# Patient Record
Sex: FEMALE | Race: WHITE | NOT HISPANIC OR LATINO | Employment: OTHER | URBAN - METROPOLITAN AREA
[De-identification: names, ages, dates, MRNs, and addresses within clinical notes are randomized per-mention and may not be internally consistent; named-entity substitution may affect disease eponyms.]

---

## 2017-02-01 ENCOUNTER — HOSPITAL ENCOUNTER (OUTPATIENT)
Dept: MAMMOGRAPHY | Facility: CLINIC | Age: 77
Discharge: HOME | End: 2017-02-01
Payer: COMMERCIAL

## 2017-02-01 DIAGNOSIS — Z12.31 VISIT FOR SCREENING MAMMOGRAM: ICD-10-CM

## 2017-02-01 PROCEDURE — 77063 BREAST TOMOSYNTHESIS BI: CPT

## 2017-02-28 ENCOUNTER — OFFICE VISIT (OUTPATIENT)
Dept: FAMILY MEDICINE | Facility: CLINIC | Age: 77
End: 2017-02-28
Payer: COMMERCIAL

## 2017-02-28 VITALS
BODY MASS INDEX: 32.39 KG/M2 | SYSTOLIC BLOOD PRESSURE: 144 MMHG | HEART RATE: 64 BPM | DIASTOLIC BLOOD PRESSURE: 80 MMHG | OXYGEN SATURATION: 99 % | WEIGHT: 189.7 LBS | HEIGHT: 64 IN | TEMPERATURE: 97.1 F

## 2017-02-28 DIAGNOSIS — Z00.01 ENCOUNTER FOR PREVENTATIVE ADULT HEALTH CARE EXAM WITH ABNORMAL FINDINGS: Primary | ICD-10-CM

## 2017-02-28 DIAGNOSIS — E78.5 HYPERLIPIDEMIA LDL GOAL <160: Chronic | ICD-10-CM

## 2017-02-28 DIAGNOSIS — M85.80 OSTEOPENIA: Chronic | ICD-10-CM

## 2017-02-28 DIAGNOSIS — I10 BENIGN HYPERTENSION: Chronic | ICD-10-CM

## 2017-02-28 DIAGNOSIS — H91.90 HEARING LOSS, UNSPECIFIED LATERALITY: ICD-10-CM

## 2017-02-28 DIAGNOSIS — Z23 NEED FOR VACCINATION: ICD-10-CM

## 2017-02-28 PROCEDURE — 90670 PCV13 VACCINE IM: CPT | Performed by: INTERNAL MEDICINE

## 2017-02-28 PROCEDURE — G0439 PPPS, SUBSEQ VISIT: HCPCS | Performed by: INTERNAL MEDICINE

## 2017-02-28 PROCEDURE — G0009 ADMIN PNEUMOCOCCAL VACCINE: HCPCS | Performed by: INTERNAL MEDICINE

## 2017-02-28 RX ORDER — ATENOLOL 50 MG/1
50 TABLET ORAL DAILY
Qty: 90 TABLET | Refills: 3 | Status: SHIPPED | OUTPATIENT
Start: 2017-02-28 | End: 2017-09-10

## 2017-02-28 NOTE — NURSING NOTE
Screening Questionnaire for Adult Immunization    Are you sick today?   No   Do you have allergies to medications, food, a vaccine component or latex?   Yes   Have you ever had a serious reaction after receiving a vaccination?   No   Do you have a long-term health problem with heart disease, lung disease, asthma, kidney disease, metabolic disease (e.g. diabetes), anemia, or other blood disorder?   Yes   Do you have cancer, leukemia, HIV/AIDS, or any other immune system problem?   No   In the past 3 months, have you taken medications that affect  your immune system, such as prednisone, other steroids, or anticancer drugs; drugs for the treatment of rheumatoid arthritis, Crohn s disease, or psoriasis; or have you had radiation treatments?   No   Have you had a seizure, or a brain or other nervous system problem?   No   During the past year, have you received a transfusion of blood or blood     products, or been given immune (gamma) globulin or antiviral drug?   No   For women: Are you pregnant or is there a chance you could become        pregnant during the next month?   No   Have you received any vaccinations in the past 4 weeks?   No     Immunization questionnaire was positive for at least one answer.  Notified pcp.      MNVFC doesn't apply on this patient    Per orders of Dr. Mcintyre, injection of prevnar given by Anita Wilhelm. Patient instructed to remain in clinic for 20 minutes afterwards, and to report any adverse reaction to me immediately.       Screening performed by Anita Wilhelm on 2/28/2017 at 1:03 PM.

## 2017-02-28 NOTE — PROGRESS NOTES
SUBJECTIVE:                                                            Rosa Cannon is a 76 year old female who presents for Preventive Visit.  Are you in the first 12 months of your Medicare Part B coverage?  No    Healthy Habits:    Do you get at least three servings of calcium containing foods daily (dairy, green leafy vegetables, etc.)? yes    Amount of exercise or daily activities, outside of work: 3-4 day(s) per week    Problems taking medications regularly No    Medication side effects: No    Have you had an eye exam in the past two years? yes    Do you see a dentist twice per year? yes    Do you have sleep apnea, excessive snoring or daytime drowsiness?no    COGNITIVE SCREEN  1) Repeat 3 items (Banana, Sunrise, Chair)    2) Clock draw: NORMAL  3) 3 item recall: Recalls 2 objects   Results: NORMAL clock, 1-2 items recalled: COGNITIVE IMPAIRMENT LESS LIKELY    Mini-CogTM Copyright S [employer]. Licensed by the author for use in [facility]; reprinted with permission (nu@[facility]). All rights reserved.      Rosa presents to the clinic today for her annual physical. She is not fasting today.    Vaginal Prolapse- OB/GYN confirmed uterine prolapsed. She was advised that if she isn't having bleeding or pain she is stable. She is not concered at this time since she is not having pain, dysuria or suffer from common infections. Discussed the pros/cons of treatments with OB/GYN.    DEXA Scan- 01/26/16 bilateral hip osteopenia but density of hip has increased 4% and Lumbar spine increased 5.2% since 02/08/12. Fosamax in the past.    Cognitive Concerns- No one has noted concern for her memory and she is still working. She still has slight forgetfulness but the information will come back to her. States she functions well.    Of Note-  Denies breast changes.   [place] without chest pain or dyspnea.   She does not do home blood pressure checks.  Quit taking Vitamin D and  is part of COSMOS trial for Cocoa supplement and MVI Outcomes Study for the past 6 months. It is a 3 year study. She doesn't know if she is on placebo or not.  Notes hearing loss in crowd situations but says she functions fine.  Eye exam every year.    Reviewed and updated as needed this visit by clinical staff  Tobacco  Allergies  Meds  Soc Hx        Reviewed and updated as needed this visit by Provider        Social History   Substance Use Topics     Smoking status: Never Smoker     Smokeless tobacco: Never Used     Alcohol use 0.0 oz/week     0 Standard drinks or equivalent per week      Comment: occasional glass of wine  q 2 weeks - 1 month        The patient does not drink >3 drinks per day nor >7 drinks per week.    Today's PHQ-2 Score:   PHQ-2 ( 1999 Pfizer) 2/28/2017 1/19/2016   Q1: Little interest or pleasure in doing things 0 0   Q2: Feeling down, depressed or hopeless 0 0   PHQ-2 Score 0 0       Do you feel safe in your environment - Yes    Do you have a Health Care Directive?: Yes: Advance Directive has been received and scanned.    Current providers sharing in care for this patient include:   Patient Care Team:  Betty Mcintyre DO as PCP - General (Internal Medicine)  Ciara Christie as       Hearing impairment: Slight hearing loss in group situation    Ability to successfully perform activities of daily living: Yes, no assistance needed     Fall risk:  Fallen 2 or more times in the past year?: No  Any fall with injury in the past year?: No    Home safety:  throw rugs in the hallway and lack of grab bars in the bathroom    The following health maintenance items are reviewed in Epic and correct as of today:  Health Maintenance   Topic Date Due     INFLUENZA VACCINE (SYSTEM ASSIGNED)  09/01/2017     TETANUS Q10 YR  11/14/2017     DEXA Q3 YR  01/14/2018     FALL RISK ASSESSMENT  02/28/2018     MAMMO Q2 YR NO INBASKET MESSAGE  02/01/2019     COLONOSCOPY Q5 YR INBASKET MESSAGE  03/20/2020  "    ADVANCE DIRECTIVE PLANNING Q5 YRS (NO INBASKET)  02/28/2021     LIPID SCREEN Q5 YR FEMALE (SYSTEM ASSIGNED)  03/01/2022     PNEUMOCOCCAL  Completed     ROS:  Comprehensive ROS negative unless as stated above in HPI.    This document serves as a record of the services and decisions personally performed and made by Betty Mcintyre DO. It was created on his/her behalf by Vera Nunn, a trained medical scribe. The creation of this document is based the provider's statements to the medical scribe.  Scribjulian Nunn 12:37 PM, February 28, 2017  OBJECTIVE:                                                            /80 (BP Location: Left arm)  Pulse 64  Temp 97.1  F (36.2  C) (Oral)  Ht 5' 4\" (1.626 m)  Wt 189 lb 11.2 oz (86 kg)  SpO2 99%  BMI 32.56 kg/m2 Estimated body mass index is 32.56 kg/(m^2) as calculated from the following:    Height as of this encounter: 5' 4\" (1.626 m).    Weight as of this encounter: 189 lb 11.2 oz (86 kg).  EXAM:   GENERAL APPEARANCE: healthy, alert and no distress  EYES: Eyes grossly normal to inspection, PERRL and conjunctivae and sclerae normal  HENT: ear canals and TM's normal, nose and mouth without ulcers or lesions, oropharynx clear and oral mucous membranes moist  NECK: no adenopathy, no asymmetry, masses, or scars and thyroid normal to palpation  RESP: lungs clear to auscultation - no rales, rhonchi or wheezes  BREAST: normal without masses, tenderness or nipple discharge and no palpable axillary masses or adenopathy  CV: regular rate and rhythm, normal S1 S2, no S3 or S4, no murmur, click or rub, no peripheral edema, no carotid bruits  ABDOMEN: soft, nontender, no hepatosplenomegaly, no masses and bowel sounds normal  MS: no musculoskeletal defects are noted and gait is age appropriate without ataxia  SKIN: no suspicious lesions or rashes, skin tags on left upper abdomen under breast  NEURO: Normal strength and tone, mentation intact and speech " "normal  PSYCH: mentation appears normal and affect normal/bright    ASSESSMENT / PLAN:                                                            1. Encounter for preventative adult health care exam with abnormal findings  Rosa will continue to monitor uterine prolapse - currently not posing a problem for her. Up to date on healthcare maintenance.  Cognition stable.    2. Benign hypertension; goal < 150/90  At goal on recheck for age  - atenolol (TENORMIN) 50 MG tablet; Take 1 tablet (50 mg) by mouth daily  Dispense: 90 tablet; Refill: 3  - CBC with platelets; Future  - Comprehensive metabolic panel; Future    3. Hyperlipidemia LDL goal <160  Family h/o CAD, stopped red rice yeast extract due to muscle aches.   - Lipid panel reflex to direct LDL; Future    4. Osteopenia  Improved on Jan 2015 DEXA as noted above in HPI.  Plan DEXA about 2018, took fosamax in the past.  - Vitamin D Deficiency; Future    5. Need for vaccination  - Pneumococcal vaccine 13 valent PCV13 IM (Prevnar) [28454]  - 1st  Administration  [[id number]]    6. Hearing loss, unspecified laterality  Considering audiology eval.  - OTOLARYNGOLOGY REFERRAL    End of Life Planning:  Patient currently has an advanced directive: Yes.  Practitioner is supportive of decision.    COUNSELING:  Reviewed preventive health counseling, as reflected in patient instructions       Regular exercise       Healthy diet/nutrition  Estimated body mass index is 32.56 kg/(m^2) as calculated from the following:    Height as of this encounter: 5' 4\" (1.626 m).    Weight as of this encounter: 189 lb 11.2 oz (86 kg).  Weight management plan: Discussed healthy diet and exercise guidelines and patient will follow up in 12 months in clinic to re-evaluate.   reports that she has never smoked. She has never used smokeless tobacco.    Patient Instructions   Schedule a fasting lab visit.  Prevnar shot today.  Referral for an audiologist for a formal hearing evaluation if you would like " to schedule an appointment.  Follow up with me annually or as needed    Appropriate preventive services were discussed with this patient, including applicable screening as appropriate for cardiovascular disease, diabetes, osteopenia/osteoporosis, and glaucoma.  As appropriate for age/gender, discussed screening for colorectal cancer, prostate cancer, breast cancer, and cervical cancer. Checklist reviewing preventive services available has been given to the patient.    Reviewed patients plan of care and provided an AVS. The Intermediate Care Plan ( asthma action plan, low back pain action plan, and migraine action plan) for Rosa meets the Care Plan requirement. This Care Plan has been established and reviewed with the Patient.    The information in this document, created by the medical scribe for me, accurately reflects the services I personally performed and the decisions made by me. I have reviewed and approved this document for accuracy prior to leaving the patient care area.  Betty Mcintyre DO  12:39 PM, 02/28/17    Betty Mcintyre DO  [facility]

## 2017-02-28 NOTE — PATIENT INSTRUCTIONS
Schedule a fasting lab visit.  Prevnar shot today.  Referral for an audiologist for a formal hearing evaluation if you would like to schedule an appointment.  Follow up with me annually or as needed    Preventive Health Recommendations    Female Ages 65 +    Yearly exam:     See your health care provider every year in order to  o Review health changes.   o Discuss preventive care.    o Review your medicines if your doctor has prescribed any.      You no longer need a yearly Pap test unless you've had an abnormal Pap test in the past 10 years. If you have vaginal symptoms, such as bleeding or discharge, be sure to talk with your provider about a Pap test.      Every 1 to 2 years, have a mammogram.  If you are over 69, talk with your health care provider about whether or not you want to continue having screening mammograms.      Every 10 years, have a colonoscopy. Or, have a yearly FIT test (stool test). These exams will check for colon cancer.       Have a cholesterol test every 5 years, or more often if your doctor advises it.       Have a diabetes test (fasting glucose) every three years. If you are at risk for diabetes, you should have this test more often.       At age 65, have a bone density scan (DEXA) to check for osteoporosis (brittle bone disease).    Shots:    Get a flu shot each year.    Get a tetanus shot every 10 years.    Talk to your doctor about your pneumonia vaccines. There are now two you should receive - Pneumovax (PPSV 23) and Prevnar (PCV 13).    Talk to your doctor about the shingles vaccine.    Talk to your doctor about the hepatitis B vaccine.    Nutrition:     Eat at least 5 servings of fruits and vegetables each day.      Eat whole-grain bread, whole-wheat pasta and brown rice instead of white grains and rice.      Talk to your provider about Calcium and Vitamin D.     Lifestyle    Exercise at least 150 minutes a week (30 minutes a day, 5 days a week). This will help you control your weight  and prevent disease.      Limit alcohol to one drink per day.      No smoking.       Wear sunscreen to prevent skin cancer.       See your dentist twice a year for an exam and cleaning.      See your eye doctor every 1 to 2 years to screen for conditions such as glaucoma, macular degeneration and cataracts.

## 2017-02-28 NOTE — MR AVS SNAPSHOT
After Visit Summary   2/28/2017    Rosa Cannon    MRN: [id number]           Patient Information     Date Of Birth          1940        Visit Information        Provider Department      2/28/2017 12:00 PM Betty Mcintyre,  [facility]        Today's Diagnoses     Encounter for preventative adult health care exam with abnormal findings    -  1    Benign hypertension; goal < 150/90        Hyperlipidemia LDL goal <160        Osteopenia        Need for vaccination        Hearing loss, unspecified laterality          Care Instructions    Schedule a fasting lab visit.  Prevnar shot today.  Referral for an audiologist for a formal hearing evaluation if you would like to schedule an appointment.  Follow up with me annually or as needed    Preventive Health Recommendations    Female Ages 65 +    Yearly exam:     See your health care provider every year in order to  o Review health changes.   o Discuss preventive care.    o Review your medicines if your doctor has prescribed any.      You no longer need a yearly Pap test unless you've had an abnormal Pap test in the past 10 years. If you have vaginal symptoms, such as bleeding or discharge, be sure to talk with your provider about a Pap test.      Every 1 to 2 years, have a mammogram.  If you are over 69, talk with your health care provider about whether or not you want to continue having screening mammograms.      Every 10 years, have a colonoscopy. Or, have a yearly FIT test (stool test). These exams will check for colon cancer.       Have a cholesterol test every 5 years, or more often if your doctor advises it.       Have a diabetes test (fasting glucose) every three years. If you are at risk for diabetes, you should have this test more often.       At age 65, have a bone density scan (DEXA) to check for osteoporosis (brittle bone disease).    Shots:    Get a flu shot each year.    Get a tetanus shot every 10 years.    Talk to your  doctor about your pneumonia vaccines. There are now two you should receive - Pneumovax (PPSV 23) and Prevnar (PCV 13).    Talk to your doctor about the shingles vaccine.    Talk to your doctor about the hepatitis B vaccine.    Nutrition:     Eat at least 5 servings of fruits and vegetables each day.      Eat whole-grain bread, whole-wheat pasta and brown rice instead of white grains and rice.      Talk to your provider about Calcium and Vitamin D.     Lifestyle    Exercise at least 150 minutes a week (30 minutes a day, 5 days a week). This will help you control your weight and prevent disease.      Limit alcohol to one drink per day.      No smoking.       Wear sunscreen to prevent skin cancer.       See your dentist twice a year for an exam and cleaning.      See your eye doctor every 1 to 2 years to screen for conditions such as glaucoma, macular degeneration and cataracts.        Follow-ups after your visit        Additional Services     OTOLARYNGOLOGY REFERRAL       Your provider has referred you to: [facility]: [place] [facility]  [place] [phone]   [email or web address]    Please be aware that coverage of these services is subject to the terms and limitations of your health insurance plan.  Call member services at your health plan with any benefit or coverage questions.      Please bring the following with you to your appointment:    (1) Any X-Rays, CTs or MRIs which have been performed.  Contact the facility where they were done to arrange for  prior to your scheduled appointment.   (2) List of current medications  (3) This referral request   (4) Any documents/labs given to you for this referral                  Future tests that were ordered for you today     Open Future Orders        Priority Expected Expires Ordered    CBC with platelets Routine 3/1/2017 8/28/2017 2/28/2017    Comprehensive metabolic panel Routine 3/1/2017 8/28/2017 2/28/2017    Lipid panel reflex to direct LDL  "Routine 3/1/2017 8/28/2017 2/28/2017    Vitamin D Deficiency Routine 3/1/2017 8/28/2017 2/28/2017            Who to contact     If you have questions or need follow up information about today's clinic visit or your schedule please contact [facility] directly at [phone].  Normal or non-critical lab and imaging results will be communicated to you by MyChart, letter or phone within 4 business days after the clinic has received the results. If you do not hear from us within 7 days, please contact the clinic through MyChart or phone. If you have a critical or abnormal lab result, we will notify you by phone as soon as possible.  Submit refill requests through [employer] or call your pharmacy and they will forward the refill request to us. Please allow 3 business days for your refill to be completed.          Additional Information About Your Visit        Care EveryWhere ID     This is your Care EveryWhere ID. This could be used by other organizations to access your [place] medical records  [id number]        Your Vitals Were     Pulse Temperature Height Pulse Oximetry BMI (Body Mass Index)       64 97.1  F (36.2  C) (Oral) 5' 4\" (1.626 m) 99% 32.56 kg/m2        Blood Pressure from Last 3 Encounters:   02/28/17 144/80   02/05/16 142/82   01/19/16 146/72    Weight from Last 3 Encounters:   02/28/17 189 lb 11.2 oz (86 kg)   02/05/16 189 lb (85.7 kg)   01/19/16 186 lb (84.4 kg)              We Performed the Following     1st  Administration  [10498]     OTOLARYNGOLOGY REFERRAL     Pneumococcal vaccine 13 valent PCV13 IM (Prevnar) [76852]          Today's Medication Changes          These changes are accurate as of: 2/28/17 12:52 PM.  If you have any questions, ask your nurse or doctor.               These medicines have changed or have updated prescriptions.        Dose/Directions    atenolol 50 MG tablet   Commonly known as:  TENORMIN   This may have changed:  See the new instructions.   Used for:  Benign " hypertension   Changed by:  Btety Mcintyre DO        Dose:  50 mg   Take 1 tablet (50 mg) by mouth daily   Quantity:  90 tablet   Refills:  3            Where to get your medicines      These medications were sent to [place] [address] [facility] - [place], [place] - [address]  [address], [facility] [address]     Phone:  [phone]     atenolol 50 MG tablet                Primary Care Provider Office Phone # Fax #    Betty Mcintyre [phone]       [facility] [address] [facility] [address]  [facility] [address]        Thank you!     Thank you for choosing [facility]  for your care. Our goal is always to provide you with excellent care. Hearing back from our patients is one way we can continue to improve our services. Please take a few minutes to complete the written survey that you may receive in the mail after your visit with us. Thank you!             Your Updated Medication List - Protect others around you: Learn how to safely use, store and throw away your medicines at www.disposemymeds.org.          This list is accurate as of: 2/28/17 12:52 PM.  Always use your most recent med list.                   Brand Name Dispense Instructions for use    atenolol 50 MG tablet    TENORMIN    90 tablet    Take 1 tablet (50 mg) by mouth daily

## 2017-02-28 NOTE — NURSING NOTE
"Chief Complaint   Patient presents with     Wellness Visit       Initial /71 (BP Location: Left arm, Patient Position: Chair, Cuff Size: Adult Large)  Pulse 64  Temp 97.1  F (36.2  C) (Oral)  Ht 5' 4\" (1.626 m)  Wt 189 lb 11.2 oz (86 kg)  SpO2 99%  BMI 32.56 kg/m2 Estimated body mass index is 32.56 kg/(m^2) as calculated from the following:    Height as of this encounter: 5' 4\" (1.626 m).    Weight as of this encounter: 189 lb 11.2 oz (86 kg).  Medication Reconciliation: complete   Antonietta Vitale MA  "

## 2017-03-01 DIAGNOSIS — E78.5 HYPERLIPIDEMIA LDL GOAL <160: Chronic | ICD-10-CM

## 2017-03-01 DIAGNOSIS — M85.80 OSTEOPENIA: Chronic | ICD-10-CM

## 2017-03-01 DIAGNOSIS — I10 BENIGN HYPERTENSION: Chronic | ICD-10-CM

## 2017-03-01 LAB
DEPRECATED CALCIDIOL+CALCIFEROL SERPL-MC: 52 UG/L (ref 20–75)
ERYTHROCYTE [DISTWIDTH] IN BLOOD BY AUTOMATED COUNT: 14.3 % (ref 10–15)
HCT VFR BLD AUTO: 38.5 % (ref 35–47)
HGB BLD-MCNC: 12.3 G/DL (ref 11.7–15.7)
MCH RBC QN AUTO: 29.3 PG (ref 26.5–33)
MCHC RBC AUTO-ENTMCNC: 31.9 G/DL (ref 31.5–36.5)
MCV RBC AUTO: 92 FL (ref 78–100)
PLATELET # BLD AUTO: 184 10E9/L (ref 150–450)
RBC # BLD AUTO: 4.2 10E12/L (ref 3.8–5.2)
WBC # BLD AUTO: 7.3 10E9/L (ref 4–11)

## 2017-03-01 PROCEDURE — 80061 LIPID PANEL: CPT | Performed by: INTERNAL MEDICINE

## 2017-03-01 PROCEDURE — 80053 COMPREHEN METABOLIC PANEL: CPT | Mod: 59 | Performed by: INTERNAL MEDICINE

## 2017-03-01 PROCEDURE — 82306 VITAMIN D 25 HYDROXY: CPT | Performed by: INTERNAL MEDICINE

## 2017-03-01 PROCEDURE — 85027 COMPLETE CBC AUTOMATED: CPT | Performed by: INTERNAL MEDICINE

## 2017-03-01 PROCEDURE — 36415 COLL VENOUS BLD VENIPUNCTURE: CPT | Performed by: INTERNAL MEDICINE

## 2017-03-02 LAB
ALBUMIN SERPL-MCNC: 3.3 G/DL (ref 3.4–5)
ALP SERPL-CCNC: 74 U/L (ref 40–150)
ALT SERPL W P-5'-P-CCNC: 19 U/L (ref 0–50)
ANION GAP SERPL CALCULATED.3IONS-SCNC: 6 MMOL/L (ref 3–14)
AST SERPL W P-5'-P-CCNC: 24 U/L (ref 0–45)
BILIRUB SERPL-MCNC: 0.4 MG/DL (ref 0.2–1.3)
BUN SERPL-MCNC: 13 MG/DL (ref 7–30)
CALCIUM SERPL-MCNC: 8.8 MG/DL (ref 8.5–10.1)
CHLORIDE SERPL-SCNC: 106 MMOL/L (ref 94–109)
CHOLEST SERPL-MCNC: 202 MG/DL
CO2 SERPL-SCNC: 27 MMOL/L (ref 20–32)
CREAT SERPL-MCNC: 0.76 MG/DL (ref 0.52–1.04)
GFR SERPL CREATININE-BSD FRML MDRD: 75 ML/MIN/1.7M2
GLUCOSE SERPL-MCNC: 96 MG/DL (ref 70–99)
HDLC SERPL-MCNC: 48 MG/DL
LDLC SERPL CALC-MCNC: 132 MG/DL
NONHDLC SERPL-MCNC: 154 MG/DL
POTASSIUM SERPL-SCNC: 4.1 MMOL/L (ref 3.4–5.3)
PROT SERPL-MCNC: 8.4 G/DL (ref 6.8–8.8)
SODIUM SERPL-SCNC: 139 MMOL/L (ref 133–144)
TRIGL SERPL-MCNC: 109 MG/DL

## 2017-09-09 ENCOUNTER — TELEPHONE (OUTPATIENT)
Dept: FAMILY MEDICINE | Facility: CLINIC | Age: 77
End: 2017-09-09

## 2017-09-09 DIAGNOSIS — I10 BENIGN HYPERTENSION: Primary | Chronic | ICD-10-CM

## 2017-09-09 NOTE — TELEPHONE ENCOUNTER
Fax from [facility] requesting an alternative to Atenolol as it on backorder    Atenolol      Last Written Prescription Date: 2/28/17  Last Fill Quantity: 90, # refills: 3    Last Office Visit with LOLITA, FABIO or [facility] prescribing provider:  2/28/17 Miguelina   Future Office Visit:        BP Readings from Last 3 Encounters:   02/28/17 144/80   02/05/16 142/82   01/19/16 146/72     RT Katie(R)

## 2017-09-10 RX ORDER — METOPROLOL TARTRATE 50 MG
50 TABLET ORAL 2 TIMES DAILY
Qty: 180 TABLET | Refills: 1 | Status: SHIPPED | OUTPATIENT
Start: 2017-09-10 | End: 2018-03-20

## 2017-09-12 NOTE — TELEPHONE ENCOUNTER
Left a detail message for pt to let her know similar Rx is Metoprolol 50 mg TWICE DAILY and Rx sent in.    Jael Lane ,CMA

## 2017-09-15 ENCOUNTER — TELEPHONE (OUTPATIENT)
Dept: FAMILY MEDICINE | Facility: CLINIC | Age: 77
End: 2017-09-15

## 2017-09-15 NOTE — TELEPHONE ENCOUNTER
I called patient and spoke with her.   She wanted to make sure that switching from Atenolol 50 mg daily to Metoprolol 50 mg BID is equivalent.    I ran it past Dr. Juares sitting at desk. Yes, this is equivalent dosing to the Atenolol 50 mg daily that she was taking.     Patient has not run out of Atenolol yet. Plans to start Metoprolol in about 2 weeks.     Kezia Saenz RN

## 2017-09-15 NOTE — TELEPHONE ENCOUNTER
Reason for Call:  prescription    Detailed comments: Patient has a question about the dose of  Metoprolol vs Atenolol    Phone Number Patient can be reached at: Home number on file [phone] (home)    Best Time: anytime    Can we leave a detailed message on this number? YES    Call taken on 9/15/2017 at 12:29 PM by Lew Hawkins

## 2017-10-16 ENCOUNTER — OFFICE VISIT (OUTPATIENT)
Dept: FAMILY MEDICINE | Facility: CLINIC | Age: 77
End: 2017-10-16
Payer: COMMERCIAL

## 2017-10-16 ENCOUNTER — NURSE TRIAGE (OUTPATIENT)
Dept: NURSING | Facility: CLINIC | Age: 77
End: 2017-10-16

## 2017-10-16 VITALS
SYSTOLIC BLOOD PRESSURE: 182 MMHG | DIASTOLIC BLOOD PRESSURE: 87 MMHG | HEIGHT: 64 IN | BODY MASS INDEX: 32.1 KG/M2 | HEART RATE: 93 BPM | WEIGHT: 188 LBS | TEMPERATURE: 97.5 F | OXYGEN SATURATION: 96 %

## 2017-10-16 DIAGNOSIS — R30.0 DYSURIA: ICD-10-CM

## 2017-10-16 DIAGNOSIS — N30.01 ACUTE CYSTITIS WITH HEMATURIA: Primary | ICD-10-CM

## 2017-10-16 LAB
ALBUMIN UR-MCNC: 30 MG/DL
APPEARANCE UR: CLEAR
BACTERIA #/AREA URNS HPF: ABNORMAL /HPF
BILIRUB UR QL STRIP: NEGATIVE
COLOR UR AUTO: YELLOW
GLUCOSE UR STRIP-MCNC: NEGATIVE MG/DL
HGB UR QL STRIP: ABNORMAL
KETONES UR STRIP-MCNC: NEGATIVE MG/DL
LEUKOCYTE ESTERASE UR QL STRIP: ABNORMAL
NITRATE UR QL: NEGATIVE
NON-SQ EPI CELLS #/AREA URNS LPF: ABNORMAL /LPF
PH UR STRIP: 6 PH (ref 5–7)
RBC #/AREA URNS AUTO: ABNORMAL /HPF
SOURCE: ABNORMAL
SP GR UR STRIP: 1.01 (ref 1–1.03)
UROBILINOGEN UR STRIP-ACNC: 0.2 EU/DL (ref 0.2–1)
WBC #/AREA URNS AUTO: ABNORMAL /HPF

## 2017-10-16 PROCEDURE — 87086 URINE CULTURE/COLONY COUNT: CPT | Performed by: NURSE PRACTITIONER

## 2017-10-16 PROCEDURE — 99213 OFFICE O/P EST LOW 20 MIN: CPT | Performed by: NURSE PRACTITIONER

## 2017-10-16 PROCEDURE — 81001 URINALYSIS AUTO W/SCOPE: CPT | Performed by: NURSE PRACTITIONER

## 2017-10-16 RX ORDER — NITROFURANTOIN 25; 75 MG/1; MG/1
100 CAPSULE ORAL 2 TIMES DAILY
Qty: 14 CAPSULE | Refills: 0 | Status: SHIPPED | OUTPATIENT
Start: 2017-10-16 | End: 2017-11-14

## 2017-10-16 NOTE — PROGRESS NOTES
SUBJECTIVE:   Rosa Cannon is a 77 year old female who presents to clinic today for the following health issues:      URINARY TRACT SYMPTOMS      Duration: 1.5 weeks    Description  dysuria and hesitancy    Intensity:  mild, moderate    Accompanying signs and symptoms:  Fever/chills: no   Flank pain no   Nausea and vomiting: no   Vaginal symptoms: none  Abdominal/Pelvic Pain: no     History- has prolapsed uterus    History of frequent UTI's: no   History of kidney stones: no   Sexually Active: no   Possibility of pregnancy: No    Precipitating or alleviating factors: None    Therapies tried and outcome: none   Outcome:       Problem list and histories reviewed & adjusted, as indicated.  Additional history: as documented    Patient Active Problem List   Diagnosis     History of colonic polyps     Benign hypertension; goal < 150/90     Hyperlipidemia LDL goal <160     Advance Care Planning     Obesity     Osteopenia     Uterovaginal prolapse, complete     Past Surgical History:   Procedure Laterality Date     C NONSPECIFIC PROCEDURE  2000    R cataract IOL     C NONSPECIFIC PROCEDURE  4/1985    detached retina repair     C NONSPECIFIC PROCEDURE  1940s     tonsillectomy     C NONSPECIFIC PROCEDURE  2000s    blephroplasty - both eyes      HC COLONOSCOPY THRU STOMA W BIOPSY/CAUTERY TUMOR/POLYP/LESION  5/2003    benign polyps - 2013 recommended        Social History   Substance Use Topics     Smoking status: Never Smoker     Smokeless tobacco: Never Used     Alcohol use 0.0 oz/week     0 Standard drinks or equivalent per week      Comment: occasional glass of wine  q 2 weeks - 1 month      Family History   Problem Relation Age of Onset     HEART DISEASE Mother      unknown heart problem - angina in her 60s      Hypertension Mother      HEART DISEASE Father      cardiomegaly - mi as well - mi in his 50s      DIABETES Father      ? possible diagnosis - last few years of life      HEART DISEASE Brother      mi at 52  "had another MI at 60     HEART DISEASE Brother      stent at 56     HEART DISEASE Brother      hypertention     Family History Negative Daughter      Family History Negative Daughter      Family History Negative Daughter      Cancer - colorectal No family hx of          Current Outpatient Prescriptions   Medication Sig Dispense Refill     nitroFURantoin, macrocrystal-monohydrate, (MACROBID) 100 MG capsule Take 1 capsule (100 mg) by mouth 2 times daily 14 capsule 0     metoprolol (LOPRESSOR) 50 MG tablet Take 1 tablet (50 mg) by mouth 2 times daily 180 tablet 1     Allergies   Allergen Reactions     Apple [Pectin]      Gas - heartburn      Pollen [Pollen Extract]      Seasonal allergies      Sulfa Drugs Hives     Unknown, maybe Hives - childhood          Reviewed and updated as needed this visit by clinical staffAllergies       Reviewed and updated as needed this visit by Provider         ROS:  Constitutional, HEENT, cardiovascular, pulmonary, gi and gu systems are negative, except as otherwise noted.      OBJECTIVE:   /87 (BP Location: Right arm, Cuff Size: Adult Large)  Pulse 93  Temp 97.5  F (36.4  C) (Oral)  Ht 5' 4\" (1.626 m)  Wt 188 lb (85.3 kg)  SpO2 96%  BMI 32.27 kg/m2  Body mass index is 32.27 kg/(m^2).  GENERAL: healthy, alert and no distress  ABDOMEN: soft, nontender  BACK: no CVA tenderness,    Diagnostic Test Results:  Results for orders placed or performed in visit on 10/16/17   *UA reflex to Microscopic and Culture ([place] [facility] (except [place] and [place])   Result Value Ref Range    Color Urine Yellow     Appearance Urine Clear     Glucose Urine Negative NEG^Negative mg/dL    Bilirubin Urine Negative NEG^Negative    Ketones Urine Negative NEG^Negative mg/dL    Specific Gravity Urine 1.010 1.003 - 1.035    Blood Urine Moderate (A) NEG^Negative    pH Urine 6.0 5.0 - 7.0 pH    Protein Albumin Urine 30 (A) NEG^Negative mg/dL    Urobilinogen Urine 0.2 0.2 - 1.0 EU/dL    " Nitrite Urine Negative NEG^Negative    Leukocyte Esterase Urine Large (A) NEG^Negative    Source Midstream Urine    Urine Microscopic   Result Value Ref Range    WBC Urine  (A) OTO2^O - 2 /HPF    RBC Urine 10-25 (A) OTO2^O - 2 /HPF    Squamous Epithelial /LPF Urine Few FEW^Few /LPF    Bacteria Urine Many (A) NEG^Negative /HPF       ASSESSMENT/PLAN:         ICD-10-CM    1. Acute cystitis with hematuria N30.01 nitroFURantoin, macrocrystal-monohydrate, (MACROBID) 100 MG capsule   2. Dysuria R30.0 *UA reflex to Microscopic and Culture ([facility] (except [place] and [place])     Urine Culture Aerobic Bacterial     Urine Microscopic   push fluids  Manually reduce prolapse with urination  Drink some cranberry juice    GEORGIE Barrett CNP  [facility]

## 2017-10-16 NOTE — LETTER
[facility]  [address] [facility] [address]  [address]  [place]  [address]  Tel: [phone]    October 18, 2017    Rosa Cannon  [address] [facility] [address]        Dear Ms. Cannon,    I believe the urinary tract infection will clear with the macrobid.  Please call if your symptoms persist when you've completed the medication.    If you have any further questions or problems, please contact our office.      Sincerely,    Shantel Patel, ALEXI/ Liberty MCMILLAN CMA  Results for orders placed or performed in visit on 10/16/17   *UA reflex to Microscopic and Culture ([place] [facility] (except [place] and [place])   Result Value Ref Range    Color Urine Yellow     Appearance Urine Clear     Glucose Urine Negative NEG^Negative mg/dL    Bilirubin Urine Negative NEG^Negative    Ketones Urine Negative NEG^Negative mg/dL    Specific Gravity Urine 1.010 1.003 - 1.035    Blood Urine Moderate (A) NEG^Negative    pH Urine 6.0 5.0 - 7.0 pH    Protein Albumin Urine 30 (A) NEG^Negative mg/dL    Urobilinogen Urine 0.2 0.2 - 1.0 EU/dL    Nitrite Urine Negative NEG^Negative    Leukocyte Esterase Urine Large (A) NEG^Negative    Source Midstream Urine    Urine Microscopic   Result Value Ref Range    WBC Urine  (A) OTO2^O - 2 /HPF    RBC Urine 10-25 (A) OTO2^O - 2 /HPF    Squamous Epithelial /LPF Urine Few FEW^Few /LPF    Bacteria Urine Many (A) NEG^Negative /HPF   Urine Culture Aerobic Bacterial   Result Value Ref Range    Specimen Description Midstream Urine     Culture Micro >100,000 colonies/mL  mixed urogenital ca                  Enclosure: Lab Results

## 2017-10-16 NOTE — NURSING NOTE
"Chief Complaint   Patient presents with     UTI       Initial /87 (BP Location: Right arm, Cuff Size: Adult Large)  Pulse 93  Temp 97.5  F (36.4  C) (Oral)  Ht 5' 4\" (1.626 m)  Wt 188 lb (85.3 kg)  SpO2 96%  BMI 32.27 kg/m2 Estimated body mass index is 32.27 kg/(m^2) as calculated from the following:    Height as of this encounter: 5' 4\" (1.626 m).    Weight as of this encounter: 188 lb (85.3 kg).  Medication Reconciliation: complete   Adrianne Corona MA    "

## 2017-10-16 NOTE — MR AVS SNAPSHOT
"              After Visit Summary   10/16/2017    Rosa Cannon    MRN: [id number]           Patient Information     Date Of Birth          1940        Visit Information        Provider Department      10/16/2017 2:30 PM Shantel Patel APRN CNP [facility]        Today's Diagnoses     Acute cystitis with hematuria    -  1    Dysuria           Follow-ups after your visit        Follow-up notes from your care team     Return if symptoms worsen or fail to improve.      Who to contact     If you have questions or need follow up information about today's clinic visit or your schedule please contact [facility] directly at [phone].  Normal or non-critical lab and imaging results will be communicated to you by MyChart, letter or phone within 4 business days after the clinic has received the results. If you do not hear from us within 7 days, please contact the clinic through MyChart or phone. If you have a critical or abnormal lab result, we will notify you by phone as soon as possible.  Submit refill requests through Member [employer]k or call your pharmacy and they will forward the refill request to us. Please allow 3 business days for your refill to be completed.          Additional Information About Your Visit        MyChart Information     Member Desk lets you send messages to your doctor, view your test results, renew your prescriptions, schedule appointments and more. To sign up, go to [email or web address] . Click on \"Log in\" on the left side of the screen, which will take you to the Welcome page. Then click on \"Sign up Now\" on the right side of the page.     You will be asked to enter the access code listed below, as well as some personal information. Please follow the directions to create your username and password.     Your access code is: [email or web address]  Expires: 2018  3:15 PM     Your access code will  in 90 days. If you need help or a new code, please call your [place] " "clinic or [phone].        Care EveryWhere ID     This is your Care EveryWhere ID. This could be used by other organizations to access your [place] medical records  [id number]        Your Vitals Were     Pulse Temperature Height Pulse Oximetry BMI (Body Mass Index)       93 97.5  F (36.4  C) (Oral) 5' 4\" (1.626 m) 96% 32.27 kg/m2        Blood Pressure from Last 3 Encounters:   10/16/17 182/87   02/28/17 144/80   02/05/16 142/82    Weight from Last 3 Encounters:   10/16/17 188 lb (85.3 kg)   02/28/17 189 lb 11.2 oz (86 kg)   02/05/16 189 lb (85.7 kg)              We Performed the Following     *UA reflex to Microscopic and Culture ([place] [facility] (except [place] and [place])     Urine Culture Aerobic Bacterial     Urine Microscopic          Today's Medication Changes          These changes are accurate as of: 10/16/17  3:18 PM.  If you have any questions, ask your nurse or doctor.               Start taking these medicines.        Dose/Directions    nitroFURantoin (macrocrystal-monohydrate) 100 MG capsule   Commonly known as:  MACROBID   Used for:  Acute cystitis with hematuria   Started by:  Shantel Patel APRN CNP        Dose:  100 mg   Take 1 capsule (100 mg) by mouth 2 times daily   Quantity:  14 capsule   Refills:  0            Where to get your medicines      These medications were sent to [facility], [place] - [address], [address]  [address], [facility] [address], [facility] [address]     Phone:  [phone]     nitroFURantoin (macrocrystal-monohydrate) 100 MG capsule                Primary Care Provider Office Phone # Fax #    Betty Mcintyre [phone] [address]  [facility] [address]        Equal Access to Services     SYDNEY VIRK : Francesca coyneo Solorena, waaxda luqadaha, qaybta kaalmada adeegyada, yosef rice. [facility] [phone].    ATENCIÓN: Si fátima greer " disposición servicios gratuitos de asistencia lingüística. Julia kaminski [phone].    We comply with applicable federal civil rights laws and [place] laws. We do not discriminate on the basis of race, color, national origin, age, disability, sex, sexual orientation, or gender identity.            Thank you!     Thank you for choosing [facility]  for your care. Our goal is always to provide you with excellent care. Hearing back from our patients is one way we can continue to improve our services. Please take a few minutes to complete the written survey that you may receive in the mail after your visit with us. Thank you!             Your Updated Medication List - Protect others around you: Learn how to safely use, store and throw away your medicines at www.disposemymeds.org.          This list is accurate as of: 10/16/17  3:18 PM.  Always use your most recent med list.                   Brand Name Dispense Instructions for use Diagnosis    metoprolol 50 MG tablet    LOPRESSOR    180 tablet    Take 1 tablet (50 mg) by mouth 2 times daily    Benign hypertension       nitroFURantoin (macrocrystal-monohydrate) 100 MG capsule    MACROBID    14 capsule    Take 1 capsule (100 mg) by mouth 2 times daily    Acute cystitis with hematuria

## 2017-10-17 LAB
BACTERIA SPEC CULT: NORMAL
SPECIMEN SOURCE: NORMAL

## 2017-10-18 NOTE — PROGRESS NOTES
I believe the urinary tract infection will clear with the macrobid.  Please call if your symptoms persist when you've completed the medication.

## 2017-11-02 ENCOUNTER — TELEPHONE (OUTPATIENT)
Dept: FAMILY MEDICINE | Facility: CLINIC | Age: 77
End: 2017-11-02

## 2017-11-02 DIAGNOSIS — R30.0 DYSURIA: Primary | ICD-10-CM

## 2017-11-02 NOTE — TELEPHONE ENCOUNTER
Please ask Kacie to schedule lab appt tomorrow to leave a stat urine specimen  Already ordered  I'll get back to her tomorrow

## 2017-11-02 NOTE — TELEPHONE ENCOUNTER
I called Kacie and we will do UA tomorrow.  We set up lab only appt.  She will go to 5th floor  Shantel will let her know what results are.   Nga Ruiz RN- Triage [employer]

## 2017-11-02 NOTE — TELEPHONE ENCOUNTER
"Rosa was dx with UTI  By Shantel and on med for 5 days. She took as directed.   Symptoms were gone completely until 2 days ago.   Rosa calling to report that she has had cloudy urine, slight dysuria and slight urinary hesitation.  She does not have the symptoms of fatigue and extremely sore \"bottom\" like she had last time.    She does not want the UTI to progress like last time so she is wondering if she needs more antibiotics or to do labs.  I pended pharmacy.  Routing to Shantel-she knows that she may not hear from us until tomorrow.     Nga Ruiz RN- Triage [employer]      URINARY TRACT SYMPTOMS  Onset: Cloudy urine x 2 days    Description:   Painful urination (Dysuria): slight   Blood in urine (Hematuria): no   Delay in urine (Hesitency): YES    Intensity: mild    Progression of Symptoms:  constant    Accompanying Signs & Symptoms:  Fever/chills: no   Flank pain no   Nausea and vomiting: no   Any vaginal symptoms: none  Abdominal/Pelvic Pain: no     History:   History of frequent UTI's: Not until a couple weeks ago.  No history  History of kidney stones: no   Sexually Active: no   Possibility of pregnancy: No    Precipitating factors:       Therapies Tried and outcome: None     "

## 2017-11-02 NOTE — TELEPHONE ENCOUNTER
Reason for call:  Patient reporting a symptom    Symptom or request: Pt was in clinic recently for UTI. Had  Prescription for a week. Pt states it was getting better but now  Feels it is getting worse again.    Duration (how long have symptoms been present): over a week    Have you been treated for this before? Yes    Additional comments: Pt would like to be contacted to discuss    Phone Number patient can be reached at:  Home number on file [phone] (home)    Best Time:  Anytime    Can we leave a detailed message on this number:  YES    Call taken on 11/2/2017 at 4:02 PM by Shilpa Whalen

## 2017-11-03 ENCOUNTER — TELEPHONE (OUTPATIENT)
Dept: FAMILY MEDICINE | Facility: CLINIC | Age: 77
End: 2017-11-03

## 2017-11-03 DIAGNOSIS — N30.01 ACUTE CYSTITIS WITH HEMATURIA: Primary | ICD-10-CM

## 2017-11-03 DIAGNOSIS — R30.0 DYSURIA: ICD-10-CM

## 2017-11-03 LAB
ALBUMIN UR-MCNC: NEGATIVE MG/DL
APPEARANCE UR: ABNORMAL
BACTERIA #/AREA URNS HPF: ABNORMAL /HPF
BILIRUB UR QL STRIP: NEGATIVE
COLOR UR AUTO: YELLOW
GLUCOSE UR STRIP-MCNC: NEGATIVE MG/DL
HGB UR QL STRIP: ABNORMAL
KETONES UR STRIP-MCNC: NEGATIVE MG/DL
LEUKOCYTE ESTERASE UR QL STRIP: ABNORMAL
NITRATE UR QL: NEGATIVE
PH UR STRIP: 6.5 PH (ref 5–7)
RBC #/AREA URNS AUTO: >100 /HPF
SOURCE: ABNORMAL
SP GR UR STRIP: 1.01 (ref 1–1.03)
UROBILINOGEN UR STRIP-ACNC: 0.2 EU/DL (ref 0.2–1)
WBC #/AREA URNS AUTO: >100 /HPF

## 2017-11-03 PROCEDURE — 87086 URINE CULTURE/COLONY COUNT: CPT | Performed by: NURSE PRACTITIONER

## 2017-11-03 PROCEDURE — 81001 URINALYSIS AUTO W/SCOPE: CPT | Performed by: NURSE PRACTITIONER

## 2017-11-03 RX ORDER — CEPHALEXIN 500 MG/1
500 CAPSULE ORAL 3 TIMES DAILY
Qty: 30 CAPSULE | Refills: 0 | Status: SHIPPED | OUTPATIENT
Start: 2017-11-03 | End: 2017-11-14

## 2017-11-03 NOTE — LETTER
[facility]  [address] [facility]  [address]  [place]  [address]  Tel: [phone]    November 6, 2017    Rosa Cannon  [address] [facility] [address]        Dear MsSoila Cannon,    The infection appears to be from the same organism.  Hopefully the new medication will work better to get rid of your symptoms.  Call the clinic if you do not get better, Kacie.    If you have any further questions or problems, please contact our office.      Sincerely,    Shantel Patel, ALEXI/ Liberty MCMILLAN CMA  Results for orders placed or performed in visit on 11/03/17   *UA reflex to Microscopic and Culture ([place] [facility] (except [place] and [place])   Result Value Ref Range    Color Urine Yellow     Appearance Urine Cloudy     Glucose Urine Negative NEG^Negative mg/dL    Bilirubin Urine Negative NEG^Negative    Ketones Urine Negative NEG^Negative mg/dL    Specific Gravity Urine 1.015 1.003 - 1.035    Blood Urine Moderate (A) NEG^Negative    pH Urine 6.5 5.0 - 7.0 pH    Protein Albumin Urine Negative NEG^Negative mg/dL    Urobilinogen Urine 0.2 0.2 - 1.0 EU/dL    Nitrite Urine Negative NEG^Negative    Leukocyte Esterase Urine Large (A) NEG^Negative    Source Midstream Urine    Urine Microscopic   Result Value Ref Range    WBC Urine >100 (A) OTO2^O - 2 /HPF    RBC Urine >100 (A) OTO2^O - 2 /HPF    Bacteria Urine Many (A) NEG^Negative /HPF   Urine Culture Aerobic Bacterial   Result Value Ref Range    Specimen Description Midstream Urine     Culture Micro       10,000 to 50,000 colonies/mL  mixed urogenital ca  Susceptibility testing not routinely done                 Enclosure: Lab Results

## 2017-11-05 LAB
BACTERIA SPEC CULT: NORMAL
SPECIMEN SOURCE: NORMAL

## 2017-11-06 NOTE — PROGRESS NOTES
The infection appears to be from the same organism.  Hopefully the new medication will work better to get rid of your symptoms.  Call the clinic if you do not get better, Kacie

## 2017-11-08 ENCOUNTER — TELEPHONE (OUTPATIENT)
Dept: FAMILY MEDICINE | Facility: CLINIC | Age: 77
End: 2017-11-08

## 2017-11-08 NOTE — TELEPHONE ENCOUNTER
TO PCP:  Thoughts on below?  Should she come in and be seen here first or directly to OB/GYN?  Thank you.  Pamela Moran RN

## 2017-11-08 NOTE — TELEPHONE ENCOUNTER
Reason for call:  Patient reporting a symptom    Symptom or request: patient has seen Shantel Patel for bladder infection symptoms in past.  These symptoms have improved, however, the past few days she has had trouble urinating.  She believes her uterus is protruded and is possibly causing her urine stream to be very irregular.    She would like to speak with Shantel if at all possible because she knows her situation.  Pt. Is not in any pain, and no fever.  She mostly wants advise as to whether she should come see her, or if she should go to the womens clinic.    Duration (how long have symptoms been present): a few days    Have you been treated for this before? Yes    Additional comments: please call to advise    Phone Number patient can be reached at:  Home number on file [phone] (home)    Best Time:  Anytime-patient will be at work 8-3:30, but can leave a message.    Can we leave a detailed message on this number:  YES    Call taken on 11/8/2017 at 4:05 PM by Ivette Mota  .

## 2017-11-09 NOTE — TELEPHONE ENCOUNTER
Geronimo (spouse of pt, on C2C) informed of below message from PCP - also given number for  [facility]   Riya MCMILLAN RN

## 2017-11-09 NOTE — TELEPHONE ENCOUNTER
Suggest she f/u with Dr. Mcknight again at [facility]. Per his note in 2015:    1. Uterovaginal prolapse, complete N81.3     Discussed findings with patient. She is not very interested in surgery. Symptoms are actually mild with minimal urinary symptoms. She was unaware that she could manually reduce the bulge into the vagina, so was happy to hear (and see) that this could be done without pain.   Will hold off on pessary until has worse symptoms. Introitus is lax with minimal muscular support so pessary may not stay in.   Discussed both vaginal hysterectomy with vaginal repair and Le[place] procedure.   Will f/u as needed.  Explained possible future bladder issues and possible UTIs if has voiding difficulties.

## 2017-11-14 ENCOUNTER — OFFICE VISIT (OUTPATIENT)
Dept: OBGYN | Facility: CLINIC | Age: 77
End: 2017-11-14
Payer: COMMERCIAL

## 2017-11-14 VITALS — WEIGHT: 188 LBS | SYSTOLIC BLOOD PRESSURE: 156 MMHG | DIASTOLIC BLOOD PRESSURE: 90 MMHG | BODY MASS INDEX: 32.27 KG/M2

## 2017-11-14 DIAGNOSIS — N30.00 ACUTE CYSTITIS WITHOUT HEMATURIA: ICD-10-CM

## 2017-11-14 DIAGNOSIS — Z46.89 ENCOUNTER FOR FITTING AND ADJUSTMENT OF PESSARY: ICD-10-CM

## 2017-11-14 DIAGNOSIS — N81.3 UTEROVAGINAL PROLAPSE, COMPLETE: Primary | ICD-10-CM

## 2017-11-14 PROCEDURE — 87086 URINE CULTURE/COLONY COUNT: CPT | Performed by: OBSTETRICS & GYNECOLOGY

## 2017-11-14 PROCEDURE — 99214 OFFICE O/P EST MOD 30 MIN: CPT | Performed by: OBSTETRICS & GYNECOLOGY

## 2017-11-14 NOTE — MR AVS SNAPSHOT
"              After Visit Summary   11/14/2017    Rosa Cannon    MRN: [id number]           Patient Information     Date Of Birth          1940        Visit Information        Provider Department      11/14/2017 2:30 PM Nannette Suazo MD [facility]        Today's Diagnoses     Uterovaginal prolapse, complete    -  1    Encounter for fitting and adjustment of pessary        Acute cystitis without hematuria           Follow-ups after your visit        Your next 10 appointments already scheduled     Nov 20, 2017  2:40 PM CST   SHORT with Nannette Suazo MD   [facility] ([facility]    [address] [place] [address] [phone]              Who to contact     If you have questions or need follow up information about today's clinic visit or your schedule please contact [facility] directly at [phone].  Normal or non-critical lab and imaging results will be communicated to you by [employer], letter or phone within 4 business days after the clinic has received the results. If you do not hear from us within 7 days, please contact the clinic through [employer] or phone. If you have a critical or abnormal lab result, we will notify you by phone as soon as possible.  Submit refill requests through [employer] or call your pharmacy and they will forward the refill request to us. Please allow 3 business days for your refill to be completed.          Additional Information About Your Visit        [employer] Information     [employer]el[employer] lets you send messages to your doctor, view your test results, renew your prescriptions, schedule appointments and more. To sign up, go to [email or web address] . Click on \"Log in\" on the left side of the screen, which will take you to the Welcome page. Then click on \"Sign up Now\" on the right side of the page.     You will be asked to enter the access code listed " below, as well as some personal information. Please follow the directions to create your username and password.     Your access code is: [email or web address]  Expires: 2018  2:15 PM     Your access code will  in 90 days. If you need help or a new code, please call your [place] clinic or [phone].        Care EveryWhere ID     This is your Care EveryWhere ID. This could be used by other organizations to access your [place] medical records  [id number]        Your Vitals Were     Breastfeeding? BMI (Body Mass Index)                No 32.27 kg/m2           Blood Pressure from Last 3 Encounters:   17 156/90   10/16/17 182/87   17 144/80    Weight from Last 3 Encounters:   17 188 lb (85.3 kg)   10/16/17 188 lb (85.3 kg)   17 189 lb 11.2 oz (86 kg)              We Performed the Following     Urine Culture Aerobic Bacterial        Primary Care Provider Office Phone # Fax #    Betty Craig Miguelina,  [phone] [address]  [place] [address]        Equal Access to Services     [facility]: Hadii aad ku hadasho Soomaali, waaxda luqadaha, qaybta kaalmada adeegyada, yosef rosales haystephenn batool montanez . So [facility] [phone].    ATENCIÓN: Si habla español, tiene a cali disposición servicios gratuitos de asistencia lingüística. Llame al [phone].    We comply with applicable federal civil rights laws and [place] laws. We do not discriminate on the basis of race, color, national origin, age, disability, sex, sexual orientation, or gender identity.            Thank you!     Thank you for choosing [facility] [place]  for your care. Our goal is always to provide you with excellent care. Hearing back from our patients is one way we can continue to improve our services. Please take a few minutes to complete the written survey that you may receive in the mail after your visit with us. Thank you!             Your Updated Medication List - Protect  others around you: Learn how to safely use, store and throw away your medicines at www.disposemymeds.org.          This list is accurate as of: 11/14/17 10:38 PM.  Always use your most recent med list.                   Brand Name Dispense Instructions for use Diagnosis    metoprolol 50 MG tablet    LOPRESSOR    180 tablet    Take 1 tablet (50 mg) by mouth 2 times daily    Benign hypertension

## 2017-11-14 NOTE — PROGRESS NOTES
"    SUBJECTIVE:                                                   Rosa Cannon is a 77 year old female who presents to clinic today for the following health issue(s):  Patient presents with:  Urinary Problem: discuss prolapsed bladder, finished 2 rounds of abx-urine yellow. No dysuria, no hematuria, denies urgency or frequency    HPI:  Rosa presents for evaluation due to worsening \"bulge\" with difficulty with urination. She states that she had her first UTI recently and it seems to be recurrent. She has to push the prolapse back in to urinate. She states that she ends up having \"dribbles of urine\" throughout the day.    No LMP recorded. Patient is postmenopausal..   Patient is sexually active, .  Using menopause for contraception.    reports that she has never smoked. She has never used smokeless tobacco.      STD testing offered?  Declined    Health maintenance updated:  yes    Today's PHQ-2 Score:   PHQ-2 (  [employer]) 2017   Q1: Little interest or pleasure in doing things 0   Q2: Feeling down, depressed or hopeless 0   PHQ-2 Score 0     Today's PHQ-9 Score:   PHQ-9 SCORE 2016   Total Score 0     Today's BRYON-7 Score:   BRYON-7 SCORE 2016   Total Score 0       Problem list and histories reviewed & adjusted, as indicated.  Additional history: as documented.    Patient Active Problem List   Diagnosis     History of colonic polyps     Benign hypertension; goal < 150/90     Hyperlipidemia LDL goal <160     Advance Care Planning     Obesity     Osteopenia     Uterovaginal prolapse, complete     Past Surgical History:   Procedure Laterality Date     C NONSPECIFIC PROCEDURE      R cataract IOL     C NONSPECIFIC PROCEDURE  1985    detached retina repair     C NONSPECIFIC PROCEDURE       tonsillectomy     C NONSPECIFIC PROCEDURE      blephroplasty - both eyes      HC COLONOSCOPY THRU STOMA W BIOPSY/CAUTERY TUMOR/POLYP/LESION  2003    benign polyps - 2013 recommended       Social " History   Substance Use Topics     Smoking status: Never Smoker     Smokeless tobacco: Never Used     Alcohol use 0.0 oz/week     0 Standard drinks or equivalent per week      Comment: occasional glass of wine  q 2 weeks - 1 month       Problem (# of Occurrences) Relation (Name,Age of Onset)    DIABETES (1) Father: ? possible diagnosis - last few years of life     Family History Negative (3) Daughter, Daughter, Daughter    HEART DISEASE (5) Mother: unknown heart problem - angina in her 60s , Father: cardiomegaly - mi as well - mi in his 50s , Brother: mi at 52 had another MI at 60, Brother: stent at 56, Brother: hypertention    Hypertension (1) Mother       Negative family history of: Cancer - colorectal            Current Outpatient Prescriptions   Medication Sig     metoprolol (LOPRESSOR) 50 MG tablet Take 1 tablet (50 mg) by mouth 2 times daily     No current facility-administered medications for this visit.      Allergies   Allergen Reactions     Apple [Pectin]      Gas - heartburn      Pollen [Pollen Extract]      Seasonal allergies      Sulfa Drugs Hives     Unknown, maybe Hives - childhood        ROS:  12 point review of systems negative other than symptoms noted below.    OBJECTIVE:     /90  Wt 188 lb (85.3 kg)  Breastfeeding? No  BMI 32.27 kg/m2  Body mass index is 32.27 kg/(m^2).    Exam:  Constitutional:  Appearance: Well nourished, well developed alert, in no acute distress  Chest:  Respiratory Effort:  Breathing unlabored  Cardiovascular: Heart: well perfused extremities  Neurologic/Psychiatric:  Mental Status:  Oriented X3   Pelvic Exam:  Complete eversion of uterus and cervix with concurrent cystocele. No erosion of vaginal mucosa.  On reduction of prolapse, cough stress test does not reveal any urinary incontinence.   Pelvic muscle strength is absent on kegel attempt.  A 2 3/4 Gellhorn pessary was inserted. Small urinary incontinence was noted on initial placement of the  pessary.    In-Clinic Test Results:  Results for orders placed or performed in visit on 11/14/17 (from the past 24 hour(s))   Urine Culture Aerobic Bacterial   Result Value Ref Range    Specimen Description Midstream Urine     Special Requests Specimen received in preservative     Culture Micro PENDING        ASSESSMENT/PLAN:                                                        ICD-10-CM    1. Uterovaginal prolapse, complete N81.3    2. Encounter for fitting and adjustment of pessary Z46.89    3. Acute cystitis without hematuria N30.00 Urine Culture Aerobic Bacterial     Stage 4 uterovaginal prolapse. We discussed surgical management versus conservative management with pessary. Given her exam today and her age she is not a good candidate for pelvic floor PT. She experienced relief of the prolapse and increased ease of urination with pessary in place. She ambulated and voided without expulsion of the pessary and without pain. Will plan to have her present for a short interval follow up next week Monday to assess continued effect.  Urine culture repeated. Recurrence likely due to incomplete emptying of urine due to prolapse.   If tolerating pessary well, will plan to have maintenance every 3 months.      Nannette Suazo MD  [facility]

## 2017-11-15 ENCOUNTER — OFFICE VISIT (OUTPATIENT)
Dept: OBGYN | Facility: CLINIC | Age: 77
End: 2017-11-15
Payer: COMMERCIAL

## 2017-11-15 VITALS
BODY MASS INDEX: 32.1 KG/M2 | DIASTOLIC BLOOD PRESSURE: 86 MMHG | SYSTOLIC BLOOD PRESSURE: 136 MMHG | WEIGHT: 188 LBS | HEIGHT: 64 IN

## 2017-11-15 DIAGNOSIS — N81.3 UTEROVAGINAL PROLAPSE, COMPLETE: Primary | ICD-10-CM

## 2017-11-15 LAB
BACTERIA SPEC CULT: NO GROWTH
Lab: NORMAL
SPECIMEN SOURCE: NORMAL

## 2017-11-15 PROCEDURE — A4561 PESSARY RUBBER, ANY TYPE: HCPCS | Performed by: OBSTETRICS & GYNECOLOGY

## 2017-11-15 PROCEDURE — 57160 INSERT PESSARY/OTHER DEVICE: CPT | Performed by: OBSTETRICS & GYNECOLOGY

## 2017-11-15 PROCEDURE — 99214 OFFICE O/P EST MOD 30 MIN: CPT | Mod: 25 | Performed by: OBSTETRICS & GYNECOLOGY

## 2017-11-15 NOTE — PROGRESS NOTES
SUBJECTIVE:                                                   Rosa Cannon is a 77 year old female who presents to clinic today for the following health issue(s):  Patient presents with:  Pessary Check/Fit/Insert: pessary placed yesterday and fell out this morning with BM      HPI:  Patient is seen following a fitting yesterday for a Gellhorn 2-3/4 pessary.  The pessary fell out this morning.  The patient did notice as stable and she was home with a pessary that she had a sudden loss of peripheral bladder.  She is concerned about ongoing incontinence from having a pessary.    No LMP recorded. Patient is postmenopausal..   Patient is not sexually active, .  Using menopause for contraception.    reports that she has never smoked. She has never used smokeless tobacco.      STD testing offered?  Declined    Health maintenance updated:  yes    Today's PHQ-2 Score:   PHQ-2 (  [employer]) 2017   Q1: Little interest or pleasure in doing things 0   Q2: Feeling down, depressed or hopeless 0   PHQ-2 Score 0     Today's PHQ-9 Score:   PHQ-9 SCORE 2016   Total Score 0     Today's BRYON-7 Score:   BRYON-7 SCORE 2016   Total Score 0       Problem list and histories reviewed & adjusted, as indicated.  Additional history: as documented.    Patient Active Problem List   Diagnosis     History of colonic polyps     Benign hypertension; goal < 150/90     Hyperlipidemia LDL goal <160     Advance Care Planning     Obesity     Osteopenia     Uterovaginal prolapse, complete     Past Surgical History:   Procedure Laterality Date     C NONSPECIFIC PROCEDURE      R cataract IOL     C NONSPECIFIC PROCEDURE  1985    detached retina repair     C NONSPECIFIC PROCEDURE       tonsillectomy     C NONSPECIFIC PROCEDURE      blephroplasty - both eyes      HC COLONOSCOPY THRU STOMA W BIOPSY/CAUTERY TUMOR/POLYP/LESION  2003    benign polyps - 2013 recommended       Social History   Substance Use Topics      "Smoking status: Never Smoker     Smokeless tobacco: Never Used     Alcohol use 0.0 oz/week     0 Standard drinks or equivalent per week      Comment: occasional glass of wine  q 2 weeks - 1 month       Problem (# of Occurrences) Relation (Name,Age of Onset)    DIABETES (1) Father: ? possible diagnosis - last few years of life     Family History Negative (3) Daughter, Daughter, Daughter    HEART DISEASE (5) Mother: unknown heart problem - angina in her 60s , Father: cardiomegaly - mi as well - mi in his 50s , Brother: mi at 52 had another MI at 60, Brother: stent at 56, Brother: hypertention    Hypertension (1) Mother       Negative family history of: Cancer - colorectal            Current Outpatient Prescriptions   Medication Sig     metoprolol (LOPRESSOR) 50 MG tablet Take 1 tablet (50 mg) by mouth 2 times daily     No current facility-administered medications for this visit.      Allergies   Allergen Reactions     Apple [Pectin]      Gas - heartburn      Pollen [Pollen Extract]      Seasonal allergies      Sulfa Drugs Hives     Unknown, maybe Hives - childhood        ROS:  12 point review of systems negative other than symptoms noted below.    OBJECTIVE:     /86  Ht 5' 4\" (1.626 m)  Wt 188 lb (85.3 kg)  BMI 32.27 kg/m2  Body mass index is 32.27 kg/(m^2).    Exam:  Constitutional:  Appearance: Well nourished, well developed alert, in no acute distress  Chest:  Respiratory Effort:  Breathing unlabored  Cardiovascular: No edema  Gastrointestinal:  Abdominal Examination:  Abdomen nontender to palpation,  Lymphatic: Lymph Nodes:  No other lymphadenopathy present  Skin:General Inspection:  No rashes present, no lesions present, no areas of discoloration; Genitalia and Groin:  No rashes present, no lesions present, no areas of discoloration, no masses present.  Neurologic/Psychiatric:  Mental Status:  Oriented X3   Pelvic Exam:  External Genitalia:     Normal appearance for age, no discharge present, no " tenderness present, no inflammatory lesions present, color normal  Vagina:     Complete prolapse  Bladder:     Nontender to palpation  Urethra:   Urethral Body:  Urethra palpation normal, urethra structural support normal   Urethral Meatus:  No erythema or lesions present  Complete uterovaginal prolapse    Perineum:     Perineum within normal limits, no evidence of trauma, no rashes or skin lesions present  Anus:     Anus within normal limits, no hemorrhoids present  Inguinal Lymph Nodes:     No lymphadenopathy present  Pubic Hair:     Normal pubic hair distribution for age  Genitalia and Groin:     No rashes present, no lesions present, no areas of discoloration, no masses present       In-Clinic Test Results:  No results found for this or any previous visit (from the past 24 hour(s)).    ASSESSMENT/PLAN:                                                        ICD-10-CM    1. Uterovaginal prolapse, complete N81.3 PESSARY RUBBER, ANY TYPE     FIT/INSERT INTRAVAG SUPPORT DEVICE/PESSARY           Plan: The patient was fitted for a 3-1/4 inch Gellhorn pessary.  I spent several time talking to her about surgery.  Both anterior and posterior repairs with vaginal hysterectomy and a colpocleisis.  Her  is ill there not sexually active.  Patient is concerned about the incontinence that she has.  She feels that if that continues after the pessaries.  Then she would definitely decide to have surgery and have a sling placed.    Visit was for 45 minutes which more than 50% of time was in face-to-face consultation.    Сергей Muñoz MD  [facility]

## 2017-11-15 NOTE — MR AVS SNAPSHOT
"              After Visit Summary   11/15/2017    Rosa Cannon    MRN: [id number]           Patient Information     Date Of Birth          1940        Visit Information        Provider Department      11/15/2017 3:15 PM Сергей Muñoz MD [facility]        Today's Diagnoses     Uterovaginal prolapse, complete    -  1       Follow-ups after your visit        Who to contact     If you have questions or need follow up information about today's clinic visit or your schedule please contact [facility] directly at [phone].  Normal or non-critical lab and imaging results will be communicated to you by [employer], letter or phone within 4 business days after the clinic has received the results. If you do not hear from us within 7 days, please contact the clinic through [employer] or phone. If you have a critical or abnormal lab result, we will notify you by phone as soon as possible.  Submit refill requests through [employer] or call your pharmacy and they will forward the refill request to us. Please allow 3 business days for your refill to be completed.          Additional Information About Your Visit        MyChart Information     [employer] lets you send messages to your doctor, view your test results, renew your prescriptions, schedule appointments and more. To sign up, go to [email or web address] . Click on \"Log in\" on the left side of the screen, which will take you to the Welcome page. Then click on \"Sign up Now\" on the right side of the page.     You will be asked to enter the access code listed below, as well as some personal information. Please follow the directions to create your username and password.     Your access code is: [email or web address]  Expires: 2018  2:15 PM     Your access code will  in 90 days. If you need help or a new code, please call your [place] clinic or [phone].        Care EveryWhere ID     This is your Care EveryWhere ID. This " "could be used by other organizations to access your [place] medical records  [id number]        Your Vitals Were     Height BMI (Body Mass Index)                5' 4\" (1.626 m) 32.27 kg/m2           Blood Pressure from Last 3 Encounters:   11/15/17 136/86   11/14/17 156/90   10/16/17 182/87    Weight from Last 3 Encounters:   11/15/17 188 lb (85.3 kg)   11/14/17 188 lb (85.3 kg)   10/16/17 188 lb (85.3 kg)              We Performed the Following     FIT/INSERT INTRAVAG SUPPORT DEVICE/PESSARY     PESSARY RUBBER, ANY TYPE        Primary Care Provider Office Phone # Fax #    Betty Mcintyre,  [phone] [address] [address]        Equal Access to Services     NELLY VIRK : Hadii aad musa hadasho Soomaali, waaxda luqadaha, qaybta kaalmada adequirinoyasindy, yosef montanez . So [facility] [phone].    ATENCIÓN: Si habla español, tiene a cali disposición servicios gratuitos de asistencia lingüística. Julia al [phone].    We comply with applicable federal civil rights laws and [place] laws. We do not discriminate on the basis of race, color, national origin, age, disability, sex, sexual orientation, or gender identity.            Thank you!     Thank you for choosing [facility]  for your care. Our goal is always to provide you with excellent care. Hearing back from our patients is one way we can continue to improve our services. Please take a few minutes to complete the written survey that you may receive in the mail after your visit with us. Thank you!             Your Updated Medication List - Protect others around you: Learn how to safely use, store and throw away your medicines at www.disposemymeds.org.          This list is accurate as of: 11/15/17  4:21 PM.  Always use your most recent med list.                   Brand Name Dispense Instructions for use Diagnosis    metoprolol 50 MG tablet    LOPRESSOR    180 tablet    Take 1 " tablet (50 mg) by mouth 2 times daily    Benign hypertension

## 2018-02-07 ENCOUNTER — HOSPITAL ENCOUNTER (OUTPATIENT)
Dept: MAMMOGRAPHY | Facility: CLINIC | Age: 78
Discharge: HOME | End: 2018-02-07
Attending: INTERNAL MEDICINE | Admitting: INTERNAL MEDICINE
Payer: COMMERCIAL

## 2018-02-07 DIAGNOSIS — Z12.31 VISIT FOR SCREENING MAMMOGRAM: ICD-10-CM

## 2018-02-07 PROCEDURE — 77067 SCR MAMMO BI INCL CAD: CPT

## 2018-03-20 DIAGNOSIS — I10 BENIGN HYPERTENSION: Chronic | ICD-10-CM

## 2018-03-21 RX ORDER — METOPROLOL TARTRATE 50 MG
TABLET ORAL
Qty: 60 TABLET | Refills: 0 | Status: SHIPPED | OUTPATIENT
Start: 2018-03-21 | End: 2018-04-18

## 2018-03-21 NOTE — TELEPHONE ENCOUNTER
Prescription approved per [facility] Refill Protocol.  30 day supply given. Due for physical.    ROBERT Chaudhary, RN, PHN

## 2018-03-21 NOTE — TELEPHONE ENCOUNTER
"Requested Prescriptions   Pending Prescriptions Disp Refills     metoprolol tartrate (LOPRESSOR) 50 MG tablet [Pharmacy Med Name: METOPROLOL TARTRATE 50 MG TAB]  Last Written Prescription Date:  9/10/2017  Last Fill Quantity: 180 tablet,  # refills: 1   Last Office Visit: 10/16/2017- VIKTORIA Patel  Future Office Visit:    180 tablet 1     Sig: TAKE 1 TABLET (50 MG) BY MOUTH 2 TIMES DAILY    Beta-Blockers Protocol Passed    3/20/2018  6:59 PM       Passed - Blood pressure under 140/90 in past 12 months    BP Readings from Last 3 Encounters:   11/15/17 136/86   11/14/17 156/90   10/16/17 182/87                Passed - Patient is age 6 or older       Passed - Recent (12 mo) or future (30 days) visit within the authorizing provider's specialty    Patient had office visit in the last 12 months or has a visit in the next 30 days with authorizing provider or within the authorizing provider's specialty.  See \"Patient Info\" tab in inbasket, or \"Choose Columns\" in Meds & Orders section of the refill encounter.              "

## 2018-04-18 DIAGNOSIS — I10 BENIGN HYPERTENSION: Chronic | ICD-10-CM

## 2018-04-18 NOTE — TELEPHONE ENCOUNTER
"metoprolol tartrate (LOPRESSOR) 50 MG tablet 60 tablet 0 3/21/2018     Last Written Prescription Date:  3/21/18  Last Fill Quantity: 60,  # refills: 0   Last office visit: 10/16/2017 with prescribing provider:  Miguelina   Future Office Visit:   Next 5 appointments (look out 90 days)     May 02, 2018  2:00 PM CDT   Office Visit with Betty Mcintyre,    [facility]    [address] [facility] [address] [phone]                 Requested Prescriptions   Pending Prescriptions Disp Refills     metoprolol tartrate (LOPRESSOR) 50 MG tablet [Pharmacy Med Name: METOPROLOL TARTRATE 50 MG TAB] 60 tablet 0     Sig: TAKE 1 TABLET (50 MG) BY MOUTH 2 TIMES DAILY. DUE FOR APPT FOR FURTHER REFILLS.    Beta-Blockers Protocol Passed    4/18/2018  1:49 AM       Passed - Blood pressure under 140/90 in past 12 months    BP Readings from Last 3 Encounters:   11/15/17 136/86   11/14/17 156/90   10/16/17 182/87                Passed - Patient is age 6 or older       Passed - Recent (12 mo) or future (30 days) visit within the authorizing provider's specialty    Patient had office visit in the last 12 months or has a visit in the next 30 days with authorizing provider or within the authorizing provider's specialty.  See \"Patient Info\" tab in inbasket, or \"Choose Columns\" in Meds & Orders section of the refill encounter.            PHQ-9 SCORE 2/5/2016   Total Score 0     "

## 2018-04-19 RX ORDER — METOPROLOL TARTRATE 50 MG
50 TABLET ORAL 2 TIMES DAILY
Qty: 60 TABLET | Refills: 5 | Status: SHIPPED | OUTPATIENT
Start: 2018-04-19 | End: 2018-05-02

## 2018-05-02 ENCOUNTER — OFFICE VISIT (OUTPATIENT)
Dept: FAMILY MEDICINE | Facility: CLINIC | Age: 78
End: 2018-05-02
Payer: COMMERCIAL

## 2018-05-02 VITALS
HEIGHT: 64 IN | TEMPERATURE: 98.1 F | SYSTOLIC BLOOD PRESSURE: 152 MMHG | WEIGHT: 187 LBS | DIASTOLIC BLOOD PRESSURE: 78 MMHG | BODY MASS INDEX: 31.92 KG/M2 | OXYGEN SATURATION: 97 % | HEART RATE: 108 BPM

## 2018-05-02 DIAGNOSIS — M85.80 OSTEOPENIA, UNSPECIFIED LOCATION: Chronic | ICD-10-CM

## 2018-05-02 DIAGNOSIS — N81.3 UTEROVAGINAL PROLAPSE, COMPLETE: Chronic | ICD-10-CM

## 2018-05-02 DIAGNOSIS — Z00.00 MEDICARE ANNUAL WELLNESS VISIT, SUBSEQUENT: Primary | ICD-10-CM

## 2018-05-02 DIAGNOSIS — I10 BENIGN HYPERTENSION: Chronic | ICD-10-CM

## 2018-05-02 DIAGNOSIS — M79.671 RIGHT FOOT PAIN: ICD-10-CM

## 2018-05-02 LAB
ERYTHROCYTE [DISTWIDTH] IN BLOOD BY AUTOMATED COUNT: 14.9 % (ref 10–15)
HCT VFR BLD AUTO: 39 % (ref 35–47)
HGB BLD-MCNC: 12.5 G/DL (ref 11.7–15.7)
MCH RBC QN AUTO: 29.7 PG (ref 26.5–33)
MCHC RBC AUTO-ENTMCNC: 32.1 G/DL (ref 31.5–36.5)
MCV RBC AUTO: 93 FL (ref 78–100)
PLATELET # BLD AUTO: 185 10E9/L (ref 150–450)
RBC # BLD AUTO: 4.21 10E12/L (ref 3.8–5.2)
WBC # BLD AUTO: 5.8 10E9/L (ref 4–11)

## 2018-05-02 PROCEDURE — 80053 COMPREHEN METABOLIC PANEL: CPT | Performed by: INTERNAL MEDICINE

## 2018-05-02 PROCEDURE — 82306 VITAMIN D 25 HYDROXY: CPT | Performed by: INTERNAL MEDICINE

## 2018-05-02 PROCEDURE — 85027 COMPLETE CBC AUTOMATED: CPT | Performed by: INTERNAL MEDICINE

## 2018-05-02 PROCEDURE — 36415 COLL VENOUS BLD VENIPUNCTURE: CPT | Performed by: INTERNAL MEDICINE

## 2018-05-02 PROCEDURE — G0439 PPPS, SUBSEQ VISIT: HCPCS | Performed by: INTERNAL MEDICINE

## 2018-05-02 RX ORDER — METOPROLOL TARTRATE 50 MG
50 TABLET ORAL 2 TIMES DAILY
Qty: 180 TABLET | Refills: 3 | Status: SHIPPED | OUTPATIENT
Start: 2018-05-02 | End: 2019-04-10

## 2018-05-02 NOTE — NURSING NOTE
"Chief Complaint   Patient presents with     Wellness Visit       Initial BP (!) 182/92 (BP Location: Left arm, Patient Position: Sitting, Cuff Size: Adult Regular)  Pulse 105  Temp 98.1  F (36.7  C) (Oral)  Ht 5' 4\" (1.626 m)  Wt 187 lb (84.8 kg)  SpO2 97%  BMI 32.1 kg/m2 Estimated body mass index is 32.1 kg/(m^2) as calculated from the following:    Height as of this encounter: 5' 4\" (1.626 m).    Weight as of this encounter: 187 lb (84.8 kg).  Medication Reconciliation: complete    Brooks Oates CMA on 5/2/2018 at 2:05 PM    "

## 2018-05-02 NOTE — LETTER
[facility]  [address]  [place]  [address]  Tel: [phone]    May 7, 2018    Rosa Cannon  [address] [facility] [address]        Dear Rosa Nelson,  I'm happy to report that your labs are perfect! Please continue with the plan of care as we discussed and let me know if you have any questions/concerns. Thanks!    If you have any further questions or problems, please contact our office.      Sincerely,    Betty Mcintyre DO/ Parul Riddle, CMA  Results for orders placed or performed in visit on 05/02/18   CBC with platelets   Result Value Ref Range    WBC 5.8 4.0 - 11.0 10e9/L    RBC Count 4.21 3.8 - 5.2 10e12/L    Hemoglobin 12.5 11.7 - 15.7 g/dL    Hematocrit 39.0 35.0 - 47.0 %    MCV 93 78 - 100 fl    MCH 29.7 26.5 - 33.0 pg    MCHC 32.1 31.5 - 36.5 g/dL    RDW 14.9 10.0 - 15.0 %    Platelet Count 185 150 - 450 10e9/L   Comprehensive metabolic panel   Result Value Ref Range    Sodium 139 133 - 144 mmol/L    Potassium 4.2 3.4 - 5.3 mmol/L    Chloride 106 94 - 109 mmol/L    Carbon Dioxide 26 20 - 32 mmol/L    Anion Gap 7 3 - 14 mmol/L    Glucose 91 70 - 99 mg/dL    Urea Nitrogen 14 7 - 30 mg/dL    Creatinine 0.74 0.52 - 1.04 mg/dL    GFR Estimate 76 >60 mL/min/1.7m2    GFR Estimate If Black >90 >60 mL/min/1.7m2    Calcium 9.4 8.5 - 10.1 mg/dL    Bilirubin Total 0.4 0.2 - 1.3 mg/dL    Albumin 3.4 3.4 - 5.0 g/dL    Protein Total 8.5 6.8 - 8.8 g/dL    Alkaline Phosphatase 74 40 - 150 U/L    ALT 19 0 - 50 U/L    AST 22 0 - 45 U/L   Vitamin D Deficiency   Result Value Ref Range    Vitamin D Deficiency screening 47 20 - 75 ug/L               Enclosure: Lab Results

## 2018-05-02 NOTE — PROGRESS NOTES
SUBJECTIVE:   Rosa Cannon is a 77 year old female who presents for Preventive Visit.  Are you in the first 12 months of your Medicare Part B coverage?  No    Healthy Habits:    Do you get at least three servings of calcium containing foods daily (dairy, green leafy vegetables, etc.)? yes    Amount of exercise or daily activities, outside of work: walking 2-3 day(s) per week    Problems taking medications regularly No    Medication side effects: No    Have you had an eye exam in the past two years? Yes     Do you see a dentist twice per year? Yes     Do you have sleep apnea, excessive snoring or daytime drowsiness? No       Ability to successfully perform activities of daily living: Yes, no assistance needed    Home safety:  none identified     Hearing impairment: No    Fall risk:  Fallen 2 or more times in the past year?: No  Any fall with injury in the past year?: No        COGNITIVE SCREEN  1) Repeat 3 items (Banana, Sunrise, Chair)    2) Clock draw: NORMAL  3) 3 item recall: Recalls 2 objects   Results: NORMAL clock, 1-2 items recalled: COGNITIVE IMPAIRMENT LESS LIKELY    Mini-CogTM Copyright [employer]. Licensed by the author for use in [facility] Audio [employer]ack; reprinted with permission (nu@[facility]). All rights reserved.          Rosa is here for annual exam.  BP very high today - was switched from Atenolol to Metoprolol earlier this year d/t  change; ran out of Metoprolol a few days ago though and didn't ask for refill as had appointment today.  Last ate at 7:30 AM (now 3 PM). BPs in the 120-140s/60-70s with the medication when she checks at home and she feels well.  DEXA - she says she'd be willing to do next year but feels she is happy to switch mammograms to every other year rather than annually.  Still in COSMOS study on Vitamin D.  Taking allergy pill during the springtime.  Pessary working fine for prolapse; wanting to avoid surgery. No bowel changes. H/o tubular adenomas in 2015 and  "told 5 yr f/u but d/t age will discuss more when we get closer in a couple years.   Sore along the right dorsum of foot between 4-5th toes with walking. Has tried new shoes but hard to help. H/o neuroma surgery there in the past 40 yrs ago. Wants to wait yet on eval and keep trying new shoes.     Meds  Problems        Social History   Substance Use Topics     Smoking status: Never Smoker     Smokeless tobacco: Never Used     Alcohol use 0.0 oz/week     0 Standard drinks or equivalent per week      Comment: occasional glass of wine  q 2 weeks - 1 month        If you drink alcohol do you typically have >3 drinks per day or >7 drinks per week? No                        Today's PHQ-2 Score:   PHQ-2 ( 1999 Pfizer) 5/2/2018 2/28/2017   Q1: Little interest or pleasure in doing things 0 0   Q2: Feeling down, depressed or hopeless 0 0   PHQ-2 Score 0 0       Do you feel safe in your environment - Yes    Do you have a Health Care Directive?: Yes: Advance Directive has been received and scanned.    Current providers sharing in care for this patient include:   Patient Care Team:  Betty Mcintyre DO as PCP - General (Internal Medicine)  Ciara Christie as     The following health maintenance items are reviewed in Epic and correct as of today:  Health Maintenance   Topic Date Due     TETANUS Q10 YR  11/14/2017     DEXA Q3 YR  01/14/2018     INFLUENZA VACCINE (Season Ended) 09/01/2018     FALL RISK ASSESSMENT  05/02/2019     MAMMO Q2 YR  02/07/2020     COLONOSCOPY Q5 YR  03/20/2020     ADVANCE DIRECTIVE PLANNING Q5 YRS  02/28/2021     LIPID SCREEN Q5 YR FEMALE (SYSTEM ASSIGNED)  03/01/2022     PNEUMOCOCCAL  Completed       ROS:  Comprehensive ROS negative unless as stated above in HPI.     OBJECTIVE:   /78  Pulse 108  Temp 98.1  F (36.7  C) (Oral)  Ht 5' 4\" (1.626 m)  Wt 187 lb (84.8 kg)  SpO2 97%  BMI 32.1 kg/m2 Estimated body mass index is 32.1 kg/(m^2) as calculated from the following:    Height as " "of this encounter: 5' 4\" (1.626 m).    Weight as of this encounter: 187 lb (84.8 kg).  EXAM:   GENERAL APPEARANCE: healthy, alert and no distress  EYES: conjunctivae and sclerae normal and chronic left eyelid droop  HENT: ear canals and TM's normal, nose and mouth without ulcers or lesions, oropharynx clear and oral mucous membranes moist  NECK: no adenopathy, no asymmetry, masses, or scars and thyroid normal to palpation  RESP: lungs clear to auscultation - no rales, rhonchi or wheezes  CV: regular mild tachy, normal S1 S2, no S3 or S4, no murmur, click or rub, no peripheral edema and no carotid bruits  ABDOMEN: soft, nontender, no hepatosplenomegaly, no masses and bowel sounds normal  MS: right foot looks unremarkable other than a well-healed scar on dorsum of foot near 4th digit - not tender to palpation and no masses appreciated  SKIN: no suspicious lesions or rashes  NEURO: Normal strength and tone, mentation intact and speech normal  PSYCH: mentation appears normal and affect normal/bright      ASSESSMENT / PLAN:   1. Medicare annual wellness visit, subsequent  Seems fairly stable overall  See HPI re: status of preventative health care    2. Benign hypertension; goal < 150/90  At goal at home but high today (better on recheck) d/t being out of meds for a few days  Needs f/u MA blood pressure check   - metoprolol tartrate (LOPRESSOR) 50 MG tablet; Take 1 tablet (50 mg) by mouth 2 times daily  Dispense: 180 tablet; Refill: 3  - CBC with platelets  - Comprehensive metabolic panel    3. Right foot pain  If she wishes to pursue further evaluation of her pain, podiatry referral is in place  - PODIATRY/FOOT & ANKLE SURGERY REFERRAL    4. Osteopenia, unspecified location  Still in COSMOS study and she would like to defer DEXA until next year  Was on Fosamax in the past  - Vitamin D Deficiency    5. Uterovaginal prolapse, complete  Improved with pessary      End of Life Planning:  Patient currently has an advanced " "directive: Yes.  Practitioner is supportive of decision.    COUNSELING:  Reviewed preventive health counseling, as reflected in patient instructions       Regular exercise       Healthy diet/nutrition  Estimated body mass index is 32.1 kg/(m^2) as calculated from the following:    Height as of this encounter: 5' 4\" (1.626 m).    Weight as of this encounter: 187 lb (84.8 kg).  Weight management plan: Discussed healthy diet and exercise guidelines and patient will follow up in 12 months in clinic to re-evaluate.   reports that she has never smoked. She has never used smokeless tobacco.      Appropriate preventive services were discussed with this patient, including applicable screening as appropriate for cardiovascular disease, diabetes, osteopenia/osteoporosis, and glaucoma.  As appropriate for age/gender, discussed screening for colorectal cancer, prostate cancer, breast cancer, and cervical cancer. Checklist reviewing preventive services available has been given to the patient.    Reviewed patients plan of care and provided an AVS. The Basic Care Plan (routine screening as documented in Health Maintenance) for Rosa meets the Care Plan requirement. This Care Plan has been established and reviewed with the Patient.    Patient Instructions   Labs today  You can skip mammogram next year if you wish and I'll order the bone density test next year at your visit  Restart Metoprolol and schedule a \"nurse visit\" to have a repeat blood pressure check  Schedule with podiatrists if you wish  Follow up at least annually or sooner if needed      Betty Mcintyre, DO  [facility]  "

## 2018-05-02 NOTE — PATIENT INSTRUCTIONS
"Labs today  You can skip mammogram next year if you wish and I'll order the bone density test next year at your visit  Restart Metoprolol and schedule a \"nurse visit\" to have a repeat blood pressure check  Schedule with podiatrists if you wish  Follow up at least annually or sooner if needed    Preventive Health Recommendations  Female Ages 65 +    Yearly exam:     See your health care provider every year in order to  o Review health changes.   o Discuss preventive care.    o Review your medicines if your doctor has prescribed any.      You no longer need a yearly Pap test unless you've had an abnormal Pap test in the past 10 years. If you have vaginal symptoms, such as bleeding or discharge, be sure to talk with your provider about a Pap test.      Every 1 to 2 years, have a mammogram.  If you are over 69, talk with your health care provider about whether or not you want to continue having screening mammograms.      Every 10 years, have a colonoscopy. Or, have a yearly FIT test (stool test). These exams will check for colon cancer.       Have a cholesterol test every 5 years, or more often if your doctor advises it.       Have a diabetes test (fasting glucose) every three years. If you are at risk for diabetes, you should have this test more often.       At age 65, have a bone density scan (DEXA) to check for osteoporosis (brittle bone disease).    Shots:    Get a flu shot each year.    Get a tetanus shot every 10 years.    Talk to your doctor about your pneumonia vaccines. There are now two you should receive - Pneumovax (PPSV 23) and Prevnar (PCV 13).    Talk to your doctor about the shingles vaccine.    Talk to your doctor about the hepatitis B vaccine.    Nutrition:     Eat at least 5 servings of fruits and vegetables each day.      Eat whole-grain bread, whole-wheat pasta and brown rice instead of white grains and rice.      Talk to your provider about Calcium and Vitamin D.     Lifestyle    Exercise at least " 150 minutes a week (30 minutes a day, 5 days a week). This will help you control your weight and prevent disease.      Limit alcohol to one drink per day.      No smoking.       Wear sunscreen to prevent skin cancer.       See your dentist twice a year for an exam and cleaning.      See your eye doctor every 1 to 2 years to screen for conditions such as glaucoma, macular degeneration and cataracts.

## 2018-05-02 NOTE — MR AVS SNAPSHOT
"              After Visit Summary   5/2/2018    Rosa Cannon    MRN: [id number]           Patient Information     Date Of Birth          1940        Visit Information        Provider Department      5/2/2018 2:00 PM Betty Mcintyre,  [facility] [place]        Today's Diagnoses     Medicare annual wellness visit, subsequent    -  1    Benign hypertension; goal < 150/90        Right foot pain        Osteopenia, unspecified location        Uterovaginal prolapse, complete          Care Instructions    Labs today  You can skip mammogram next year if you wish and I'll order the bone density test next year at your visit  Restart Metoprolol and schedule a \"nurse visit\" to have a repeat blood pressure check  Schedule with podiatrists if you wish  Follow up at least annually or sooner if needed    Preventive Health Recommendations  Female Ages 65 +    Yearly exam:     See your health care provider every year in order to  o Review health changes.   o Discuss preventive care.    o Review your medicines if your doctor has prescribed any.      You no longer need a yearly Pap test unless you've had an abnormal Pap test in the past 10 years. If you have vaginal symptoms, such as bleeding or discharge, be sure to talk with your provider about a Pap test.      Every 1 to 2 years, have a mammogram.  If you are over 69, talk with your health care provider about whether or not you want to continue having screening mammograms.      Every 10 years, have a colonoscopy. Or, have a yearly FIT test (stool test). These exams will check for colon cancer.       Have a cholesterol test every 5 years, or more often if your doctor advises it.       Have a diabetes test (fasting glucose) every three years. If you are at risk for diabetes, you should have this test more often.       At age 65, have a bone density scan (DEXA) to check for osteoporosis (brittle bone disease).    Shots:    Get a flu shot each year.    Get a tetanus shot " every 10 years.    Talk to your doctor about your pneumonia vaccines. There are now two you should receive - Pneumovax (PPSV 23) and Prevnar (PCV 13).    Talk to your doctor about the shingles vaccine.    Talk to your doctor about the hepatitis B vaccine.    Nutrition:     Eat at least 5 servings of fruits and vegetables each day.      Eat whole-grain bread, whole-wheat pasta and brown rice instead of white grains and rice.      Talk to your provider about Calcium and Vitamin D.     Lifestyle    Exercise at least 150 minutes a week (30 minutes a day, 5 days a week). This will help you control your weight and prevent disease.      Limit alcohol to one drink per day.      No smoking.       Wear sunscreen to prevent skin cancer.       See your dentist twice a year for an exam and cleaning.      See your eye doctor every 1 to 2 years to screen for conditions such as glaucoma, macular degeneration and cataracts.          Follow-ups after your visit        Additional Services     PODIATRY/FOOT & ANKLE SURGERY REFERRAL       Your provider has referred you to: [facility]: [facility] [phone]   [email or web address]    Please be aware that coverage of these services is subject to the terms and limitations of your health insurance plan.  Call member services at your health plan with any benefit or coverage questions.      Please bring the following to your appointment:  >>   Any x-rays, CTs or MRIs which have been performed.  Contact the facility where they were done to arrange for  prior to your scheduled appointment.    >>   List of current medications   >>   This referral request   >>   Any documents/labs given to you for this referral                  Who to contact     If you have questions or need follow up information about today's clinic visit or your schedule please contact [facility] directly at [phone].  Normal or non-critical lab and imaging results will  "be communicated to you by MyChart, letter or phone within 4 business days after the clinic has received the results. If you do not hear from us within 7 days, please contact the clinic through [employer] or phone. If you have a critical or abnormal lab result, we will notify you by phone as soon as possible.  Submit refill requests through [employer] or call your pharmacy and they will forward the refill request to us. Please allow 3 business days for your refill to be completed.          Additional Information About Your Visit        P[employer] Information     [employer] lets you send messages to your doctor, view your test results, renew your prescriptions, schedule appointments and more. To sign up, go to [email or web address] . Click on \"Log in\" on the left side of the screen, which will take you to the Welcome page. Then click on \"Sign up Now\" on the right side of the page.     You will be asked to enter the access code listed below, as well as some personal information. Please follow the directions to create your username and password.     Your access code is: [email or web address]  Expires: 2018  2:47 PM     Your access code will  in 90 days. If you need help or a new code, please call your [facility] clinic or [phone].        Care EveryWhere ID     This is your Care EveryWhere ID. This could be used by other organizations to access your [facility] medical records  [id number]        Your Vitals Were     Pulse Temperature Height Pulse Oximetry BMI (Body Mass Index)       108 98.1  F (36.7  C) (Oral) 5' 4\" (1.626 m) 97% 32.1 kg/m2        Blood Pressure from Last 3 Encounters:   18 152/78   11/15/17 136/86   17 156/90    Weight from Last 3 Encounters:   18 187 lb (84.8 kg)   11/15/17 188 lb (85.3 kg)   17 188 lb (85.3 kg)              We Performed the Following     CBC with platelets     Comprehensive metabolic panel     PODIATRY/FOOT & ANKLE SURGERY REFERRAL     Vitamin D Deficiency        "   Where to get your medicines      These medications were sent to [facility] [address] - [place], [place] - [address]  [address], [facility] [address]     Phone:  [phone]     metoprolol tartrate 50 MG tablet          Primary Care Provider Office Phone # Fax #    Betty Mcintyre [phone] [address] [facility] [address]  [facility] [address]        Equal Access to Services     SYDNEY VIRK : Hadii aad ku hadasho Soomaali, waaxda luqadaha, qaybta kaalmada adeegyada, waxay idiin hayaan adeeg kharash la'tera . So [facility] [phone].    ATENCIÓN: Si habla español, tiene a cali disposición servicios gratuitos de asistencia lingüística. [facility] [phone].    We comply with applicable federal civil rights laws and [place] laws. We do not discriminate on the basis of race, color, national origin, age, disability, sex, sexual orientation, or gender identity.            Thank you!     Thank you for choosing [facility]  for your care. Our goal is always to provide you with excellent care. Hearing back from our patients is one way we can continue to improve our services. Please take a few minutes to complete the written survey that you may receive in the mail after your visit with us. Thank you!             Your Updated Medication List - Protect others around you: Learn how to safely use, store and throw away your medicines at www.disposemymeds.org.          This list is accurate as of 5/2/18  2:47 PM.  Always use your most recent med list.                   Brand Name Dispense Instructions for use Diagnosis    metoprolol tartrate 50 MG tablet    LOPRESSOR    180 tablet    Take 1 tablet (50 mg) by mouth 2 times daily    Benign hypertension       VITAMIN D (CHOLECALCIFEROL) PO      Take by mouth daily

## 2018-05-03 LAB
ALBUMIN SERPL-MCNC: 3.4 G/DL (ref 3.4–5)
ALP SERPL-CCNC: 74 U/L (ref 40–150)
ALT SERPL W P-5'-P-CCNC: 19 U/L (ref 0–50)
ANION GAP SERPL CALCULATED.3IONS-SCNC: 7 MMOL/L (ref 3–14)
AST SERPL W P-5'-P-CCNC: 22 U/L (ref 0–45)
BILIRUB SERPL-MCNC: 0.4 MG/DL (ref 0.2–1.3)
BUN SERPL-MCNC: 14 MG/DL (ref 7–30)
CALCIUM SERPL-MCNC: 9.4 MG/DL (ref 8.5–10.1)
CHLORIDE SERPL-SCNC: 106 MMOL/L (ref 94–109)
CO2 SERPL-SCNC: 26 MMOL/L (ref 20–32)
CREAT SERPL-MCNC: 0.74 MG/DL (ref 0.52–1.04)
DEPRECATED CALCIDIOL+CALCIFEROL SERPL-MC: 47 UG/L (ref 20–75)
GFR SERPL CREATININE-BSD FRML MDRD: 76 ML/MIN/1.7M2
GLUCOSE SERPL-MCNC: 91 MG/DL (ref 70–99)
POTASSIUM SERPL-SCNC: 4.2 MMOL/L (ref 3.4–5.3)
PROT SERPL-MCNC: 8.5 G/DL (ref 6.8–8.8)
SODIUM SERPL-SCNC: 139 MMOL/L (ref 133–144)

## 2019-04-10 ENCOUNTER — OFFICE VISIT (OUTPATIENT)
Dept: FAMILY MEDICINE | Facility: CLINIC | Age: 79
End: 2019-04-10
Payer: COMMERCIAL

## 2019-04-10 VITALS
WEIGHT: 190 LBS | SYSTOLIC BLOOD PRESSURE: 149 MMHG | BODY MASS INDEX: 32.44 KG/M2 | TEMPERATURE: 97.9 F | DIASTOLIC BLOOD PRESSURE: 80 MMHG | HEART RATE: 64 BPM | HEIGHT: 64 IN | OXYGEN SATURATION: 97 %

## 2019-04-10 DIAGNOSIS — I10 BENIGN HYPERTENSION: Chronic | ICD-10-CM

## 2019-04-10 DIAGNOSIS — Z00.00 MEDICARE ANNUAL WELLNESS VISIT, SUBSEQUENT: Primary | ICD-10-CM

## 2019-04-10 DIAGNOSIS — E78.5 HYPERLIPIDEMIA LDL GOAL <130: ICD-10-CM

## 2019-04-10 LAB
ANION GAP SERPL CALCULATED.3IONS-SCNC: 8 MMOL/L (ref 3–14)
BASOPHILS # BLD AUTO: 0 10E9/L (ref 0–0.2)
BASOPHILS NFR BLD AUTO: 0.2 %
BUN SERPL-MCNC: 14 MG/DL (ref 7–30)
CALCIUM SERPL-MCNC: 9.4 MG/DL (ref 8.5–10.1)
CHLORIDE SERPL-SCNC: 106 MMOL/L (ref 94–109)
CHOLEST SERPL-MCNC: 210 MG/DL
CO2 SERPL-SCNC: 25 MMOL/L (ref 20–32)
CREAT SERPL-MCNC: 0.77 MG/DL (ref 0.52–1.04)
DIFFERENTIAL METHOD BLD: NORMAL
EOSINOPHIL # BLD AUTO: 0.1 10E9/L (ref 0–0.7)
EOSINOPHIL NFR BLD AUTO: 1.9 %
ERYTHROCYTE [DISTWIDTH] IN BLOOD BY AUTOMATED COUNT: 14.9 % (ref 10–15)
GFR SERPL CREATININE-BSD FRML MDRD: 75 ML/MIN/{1.73_M2}
GLUCOSE SERPL-MCNC: 97 MG/DL (ref 70–99)
HCT VFR BLD AUTO: 39.6 % (ref 35–47)
HDLC SERPL-MCNC: 48 MG/DL
HGB BLD-MCNC: 12.8 G/DL (ref 11.7–15.7)
LDLC SERPL CALC-MCNC: 139 MG/DL
LYMPHOCYTES # BLD AUTO: 1.5 10E9/L (ref 0.8–5.3)
LYMPHOCYTES NFR BLD AUTO: 23.6 %
MCH RBC QN AUTO: 30.3 PG (ref 26.5–33)
MCHC RBC AUTO-ENTMCNC: 32.3 G/DL (ref 31.5–36.5)
MCV RBC AUTO: 94 FL (ref 78–100)
MONOCYTES # BLD AUTO: 0.4 10E9/L (ref 0–1.3)
MONOCYTES NFR BLD AUTO: 6.4 %
NEUTROPHILS # BLD AUTO: 4.4 10E9/L (ref 1.6–8.3)
NEUTROPHILS NFR BLD AUTO: 67.9 %
NONHDLC SERPL-MCNC: 162 MG/DL
PLATELET # BLD AUTO: 186 10E9/L (ref 150–450)
POTASSIUM SERPL-SCNC: 4.4 MMOL/L (ref 3.4–5.3)
RBC # BLD AUTO: 4.23 10E12/L (ref 3.8–5.2)
SODIUM SERPL-SCNC: 139 MMOL/L (ref 133–144)
TRIGL SERPL-MCNC: 114 MG/DL
WBC # BLD AUTO: 6.4 10E9/L (ref 4–11)

## 2019-04-10 PROCEDURE — 80061 LIPID PANEL: CPT | Performed by: INTERNAL MEDICINE

## 2019-04-10 PROCEDURE — G0439 PPPS, SUBSEQ VISIT: HCPCS | Performed by: INTERNAL MEDICINE

## 2019-04-10 PROCEDURE — 80048 BASIC METABOLIC PNL TOTAL CA: CPT | Performed by: INTERNAL MEDICINE

## 2019-04-10 PROCEDURE — 36415 COLL VENOUS BLD VENIPUNCTURE: CPT | Performed by: INTERNAL MEDICINE

## 2019-04-10 PROCEDURE — 85025 COMPLETE CBC W/AUTO DIFF WBC: CPT | Performed by: INTERNAL MEDICINE

## 2019-04-10 RX ORDER — METOPROLOL TARTRATE 50 MG
50 TABLET ORAL 2 TIMES DAILY
Qty: 180 TABLET | Refills: 3 | Status: SHIPPED | OUTPATIENT
Start: 2019-04-10 | End: 2020-04-06

## 2019-04-10 ASSESSMENT — ACTIVITIES OF DAILY LIVING (ADL): CURRENT_FUNCTION: NO ASSISTANCE NEEDED

## 2019-04-10 ASSESSMENT — MIFFLIN-ST. JEOR: SCORE: 1336.83

## 2019-04-10 NOTE — PATIENT INSTRUCTIONS
Patient Education   Personalized Prevention Plan  You are due for the preventive services outlined below.  Your care team is available to assist you in scheduling these services.  If you have already completed any of these items, please share that information with your care team to update in your medical record.  Health Maintenance Due   Topic Date Due     Diptheria Tetanus Pertussis (DTAP/TDAP/TD) Vaccine (1 - Tdap) 10/05/1967     Zoster (Shingles) Vaccine (2 of 3) 03/11/2012     Osteoporosis Screening (Dexa) - every 3 years   01/14/2018     Annual Wellness Visit  05/02/2019     FALL RISK ASSESSMENT  05/02/2019       Urinary Incontinence, Female (Adult)  Urinary incontinence means loss of control of the bladder. This problem affects many women, especially as they get older. If you have incontinence, you may be embarrassed to ask for help. But know that this problem can be treated.  Types of Incontinence  There are different types of incontinence. Two of the main types are described here. You can have more than one type.    Stress incontinence. With this type, urine leaks when pressure (stress) is put on the bladder. This may happen when you cough, sneeze, or laugh. Stress incontinence most often occurs because the pelvic floor muscles that support the bladder and urethra are weak. This can happen after pregnancy and vaginal childbirth or a hysterectomy. It can also be due to excess body weight or hormone changes.    Urge incontinence (also called overactive bladder). With this type, a sudden urge to urinate is felt often. This may happen even though there may not be much urine in the bladder. The need to urinate often during the night is common. Urge incontinence most often occurs because of bladder spasms. This may be due to bladder irritation or infection. Damage to bladder nerves or pelvic muscles, constipation, and certain medicines can also lead to urge incontinence.  Treatment of urinary incontinence  depends on the cause. Further evaluation is needed to find the type you have. This will likely include an exam and certain tests. Based on the results, you and your healthcare provider can then plan treatment. Until a diagnosis is made, the home care tips below can help relieve symptoms.  Home care    Do pelvic floor muscle exercises, if they are prescribed. The pelvic floor muscles help support the bladder and urethra. Many women find that their symptoms improve when doing special exercises that strengthen these muscles. To do the exercises contract the muscles you would use to stop your stream of urine, but do this when you re not urinating. Hold for 10 seconds, then relax. Repeat 10 to 20 times in a row, at least 3 times a day. Your provider may give you other instructions for how to do the exercises and how often.    Keep a bladder diary. This helps track how often and how much you urinate over a set period of time. Bring this diary with you to your next visit with the provider. The information can help your provider learn more about your bladder problem.    Lose weight, if advised to by your provider. Excess weight puts pressure on the bladder. Your provider can help you create a weight-loss plan that s right for you. This may include exercising more and making certain diet changes.    Don't consume foods and drinks that may irritate the bladder. These can include alcohol and caffeinated drinks.    Quit smoking. Smoking and other tobacco use can lead to chronic cough that strains the pelvic floor muscles. Smoking may also damage the bladder and urethra. Talk with your provider about treatments or methods you can use to quit smoking.    If drinking large amounts of fluid causes you to have symptoms, you may be advised to limit your fluid intake. You may also be advised to drink most of your fluids during the day and to limit fluids at night.    If you re worried about urine leakage or accidents, you may wear  absorbent pads to catch urine. Change the pads often. This helps reduce discomfort. It may also reduce the risk of skin or bladder infections.  Follow-up care  Follow up with your healthcare provider, or as directed. It may take some to find the right treatment for your problem. Your treatment plan may include special therapies or medicines. Certain procedures or surgery may also be options. Be sure to discuss any questions you have with your provider.  When to seek medical advice  Call the healthcare provider right away if any of these occur:    Fever of 100.4 F (38 C) or higher, or as directed by your provider    Bladder pain or fullness    Abdominal swelling    Nausea or vomiting    Back pain    Weakness, dizziness or fainting  Date Last Reviewed: 10/1/2017    1629-3398 [employer]. [address] [place] [address], [place] [address]. All rights reserved. This information is not intended as a substitute for professional medical care. Always follow your healthcare professional's instructions.

## 2019-04-10 NOTE — LETTER
[facility]  [address] [facility]  [address]  [place]  [address]  Tel: [phone]    April 10, 2019    Rosa LUIS Cannon  [address] [facility] [id number]        Dear Ms. Cannon,    Your labs are  Ok except for mild hyperlipidemia. Advise diet and exercise. No change in meds.    If you have any further questions or problems, please contact our office.      Sincerely,    Jerod Champagne MD/ Parul Riddle CMA  Results for orders placed or performed in visit on 04/10/19   Lipid panel reflex to direct LDL Fasting   Result Value Ref Range    Cholesterol 210 (H) <200 mg/dL    Triglycerides 114 <150 mg/dL    HDL Cholesterol 48 (L) >49 mg/dL    LDL Cholesterol Calculated 139 (H) <100 mg/dL    Non HDL Cholesterol 162 (H) <130 mg/dL   CBC with platelets and differential   Result Value Ref Range    WBC 6.4 4.0 - 11.0 10e9/L    RBC Count 4.23 3.8 - 5.2 10e12/L    Hemoglobin 12.8 11.7 - 15.7 g/dL    Hematocrit 39.6 35.0 - 47.0 %    MCV 94 78 - 100 fl    MCH 30.3 26.5 - 33.0 pg    MCHC 32.3 31.5 - 36.5 g/dL    RDW 14.9 10.0 - 15.0 %    Platelet Count 186 150 - 450 10e9/L    % Neutrophils 67.9 %    % Lymphocytes 23.6 %    % Monocytes 6.4 %    % Eosinophils 1.9 %    % Basophils 0.2 %    Absolute Neutrophil 4.4 1.6 - 8.3 10e9/L    Absolute Lymphocytes 1.5 0.8 - 5.3 10e9/L    Absolute Monocytes 0.4 0.0 - 1.3 10e9/L    Absolute Eosinophils 0.1 0.0 - 0.7 10e9/L    Absolute Basophils 0.0 0.0 - 0.2 10e9/L    Diff Method Automated Method    Basic metabolic panel  (Ca, Cl, CO2, Creat, Gluc, K, Na, BUN)   Result Value Ref Range    Sodium 139 133 - 144 mmol/L    Potassium 4.4 3.4 - 5.3 mmol/L    Chloride 106 94 - 109 mmol/L    Carbon Dioxide 25 20 - 32 mmol/L    Anion Gap 8 3 - 14 mmol/L    Glucose 97 70 - 99 mg/dL    Urea Nitrogen 14 7 - 30 mg/dL    Creatinine 0.77 0.52 - 1.04 mg/dL    GFR Estimate 75 >60 mL/min/[1.73_m2]    GFR Estimate If Black 87 >60 mL/min/[1.73_m2]    Calcium 9.4 8.5 - 10.1 mg/dL                Enclosure: Lab Results

## 2019-04-10 NOTE — PROGRESS NOTES
SUBJECTIVE:   Rosa Cannon is a 78 year old female who presents for Preventive Visit.    Are you in the first 12 months of your Medicare coverage?  No    Healthy Habits:     In general, how would you rate your overall health?  Very good    Frequency of exercise:  2-3 days/week    Duration of exercise:  45-60 minutes    Taking medications regularly:  No    Barriers to taking medications:  None    Medication side effects:  None    Ability to successfully perform activities of daily living:  No assistance needed    Home Safety:  Lack of grab bars in the bathroom    Hearing Impairment:  No hearing concerns    In the past 6 months, have you been bothered by leaking of urine? Yes    In general, how would you rate your overall mental or emotional health?  Very good      PHQ-2 Total Score: 0    Additional concerns today:  No    Do you feel safe in your environment? Yes    Do you have a Health Care Directive? Yes: Advance Directive has been received and scanned.      Fall risk  Fallen 2 or more times in the past year?: No  Any fall with injury in the past year?: No    Cognitive Screening   1) Repeat 3 items (Leader, Season, Table)    2) Clock draw: NORMAL  3) 3 item recall: Recalls 2 objects   Results: NORMAL clock, 1-2 items recalled: COGNITIVE IMPAIRMENT LESS LIKELY    Mini-CogTM Copyright [employer]. Licensed by the author for use in [facility]; reprinted with permission (soob@.[email or web address]). All rights reserved.      Do you have sleep apnea, excessive snoring or daytime drowsiness?: no    Reviewed and updated as needed this visit by clinical staff         Reviewed and updated as needed this visit by Provider        Social History     Tobacco Use     Smoking status: Never Smoker     Smokeless tobacco: Never Used   Substance Use Topics     Alcohol use: Yes     Alcohol/week: 0.0 oz     Comment: occasional glass of wine  q 2 weeks - 1 month      If you drink alcohol do you typically have >3 drinks per day or >7  "drinks per week? No    Alcohol Use 2/28/2017   Prescreen: >3 drinks/day or >7 drinks/week? The patient does not drink >3 drinks per day nor >7 drinks per week.   No flowsheet data found.      Current providers sharing in care for this patient include: Patient Care Team:  Betty Mcintyre DO as PCP - General (Internal Medicine)  Betty Mcitnyre DO as PCP - Assigned PCP  Ciara Christie as   Betty Mcintyre DO as Assigned PCP    The following health maintenance items are reviewed in Epic and correct as of today:  Health Maintenance   Topic Date Due     DTAP/TDAP/TD IMMUNIZATION (1 - Tdap) 10/05/1967     ZOSTER IMMUNIZATION (2 of 3) 03/11/2012     DEXA Q3 YR  01/14/2018     MEDICARE ANNUAL WELLNESS VISIT  05/02/2019     FALL RISK ASSESSMENT  05/02/2019     PHQ-2 Q1 YR  05/02/2019     MAMMO Q2 YR  02/07/2020     COLONOSCOPY Q5 YR  03/20/2020     ADVANCE DIRECTIVE PLANNING Q5 YRS  02/28/2021     LIPID SCREEN Q5 YR FEMALE (SYSTEM ASSIGNED)  03/01/2022     INFLUENZA VACCINE  Completed     PNEUMOCOCCAL IMMUNIZATION 65+ LOW/MEDIUM RISK  Completed     IPV IMMUNIZATION  Aged Out     MENINGITIS IMMUNIZATION  Aged Out     Labs reviewed in EPIC      Review of Systems  Constitutional, HEENT, cardiovascular, pulmonary, GI, , musculoskeletal, neuro, skin, endocrine and psych systems are negative, except as otherwise noted.    OBJECTIVE:   /80 (BP Location: Right arm, Patient Position: Sitting, Cuff Size: Adult Large)   Pulse 64   Temp 97.9  F (36.6  C) (Oral)   Ht 1.626 m (5' 4\")   Wt 86.2 kg (190 lb)   SpO2 97%   BMI 32.61 kg/m   Estimated body mass index is 32.1 kg/m  as calculated from the following:    Height as of 5/2/18: 1.626 m (5' 4\").    Weight as of 5/2/18: 84.8 kg (187 lb).  Physical Exam  GENERAL APPEARANCE: alert and no distress  EYES: Eyes grossly normal to inspection, PERRL and conjunctivae and sclerae normal  HENT: ear canals and TM's normal, nose and mouth without ulcers or lesions, " oropharynx clear and oral mucous membranes moist  NECK: no adenopathy, no asymmetry, masses, or scars and thyroid normal to palpation  RESP: lungs clear to auscultation - no rales, rhonchi or wheezes  CV: regular rate and rhythm, normal S1 S2, no S3 or S4, no murmur, click or rub, no peripheral edema and peripheral pulses strong  ABDOMEN: soft, nontender, no hepatosplenomegaly, no masses and bowel sounds normal  MS: no musculoskeletal defects are noted and gait is age appropriate without ataxia  SKIN: no suspicious lesions or rashes  NEURO: Normal strength and tone, sensory exam grossly normal, mentation intact and speech normal  PSYCH: mentation appears normal and affect normal/bright    Diagnostic Test Results:  Results for orders placed or performed in visit on 05/02/18   CBC with platelets   Result Value Ref Range    WBC 5.8 4.0 - 11.0 10e9/L    RBC Count 4.21 3.8 - 5.2 10e12/L    Hemoglobin 12.5 11.7 - 15.7 g/dL    Hematocrit 39.0 35.0 - 47.0 %    MCV 93 78 - 100 fl    MCH 29.7 26.5 - 33.0 pg    MCHC 32.1 31.5 - 36.5 g/dL    RDW 14.9 10.0 - 15.0 %    Platelet Count 185 150 - 450 10e9/L   Comprehensive metabolic panel   Result Value Ref Range    Sodium 139 133 - 144 mmol/L    Potassium 4.2 3.4 - 5.3 mmol/L    Chloride 106 94 - 109 mmol/L    Carbon Dioxide 26 20 - 32 mmol/L    Anion Gap 7 3 - 14 mmol/L    Glucose 91 70 - 99 mg/dL    Urea Nitrogen 14 7 - 30 mg/dL    Creatinine 0.74 0.52 - 1.04 mg/dL    GFR Estimate 76 >60 mL/min/1.7m2    GFR Estimate If Black >90 >60 mL/min/1.7m2    Calcium 9.4 8.5 - 10.1 mg/dL    Bilirubin Total 0.4 0.2 - 1.3 mg/dL    Albumin 3.4 3.4 - 5.0 g/dL    Protein Total 8.5 6.8 - 8.8 g/dL    Alkaline Phosphatase 74 40 - 150 U/L    ALT 19 0 - 50 U/L    AST 22 0 - 45 U/L   Vitamin D Deficiency   Result Value Ref Range    Vitamin D Deficiency screening 47 20 - 75 ug/L       ASSESSMENT / PLAN:   (Z00.00) Medicare annual wellness visit, subsequent  (primary encounter diagnosis)  (I10) Benign  "hypertension; goal < 150/90  Comment: patient is due for refill of Metoprolol BP medication  Plan: metoprolol tartrate (LOPRESSOR) 50 MG tablet, CBC with platelets and differential, Basic         metabolic panel  (Ca, Cl, CO2, Creat, Gluc, K,         Na, BUN)      (E78.5) Hyperlipidemia LDL goal <130  Comment: patient is due for lipid panel lab  Plan: Lipid panel reflex to direct LDL Fasting      End of Life Planning:  Patient currently has an advanced directive: Yes.  Practitioner is supportive of decision.    COUNSELING:  Reviewed preventive health counseling, as reflected in patient instructions  Special attention given to:       Regular exercise       Healthy diet/nutrition    BP Readings from Last 1 Encounters:   05/02/18 152/78     Estimated body mass index is 32.1 kg/m  as calculated from the following:    Height as of 5/2/18: 1.626 m (5' 4\").    Weight as of 5/2/18: 84.8 kg (187 lb).      Weight management plan: Discussed healthy diet and exercise guidelines     reports that she has never smoked. She has never used smokeless tobacco.      Appropriate preventive services were discussed with this patient, including applicable screening as appropriate for cardiovascular disease, diabetes, osteopenia/osteoporosis, and glaucoma.  As appropriate for age/gender, discussed screening for colorectal cancer, prostate cancer, breast cancer, and cervical cancer. Checklist reviewing preventive services available has been given to the patient.    Reviewed patients plan of care and provided an AVS. The Basic Care Plan (routine screening as documented in Health Maintenance) for Rosa meets the Care Plan requirement. This Care Plan has been established and reviewed with the Patient.    Counseling Resources:  ATP IV Guidelines  Pooled Cohorts Equation Calculator  Breast Cancer Risk Calculator  FRAX Risk Assessment  ICSI Preventive Guidelines  Dietary Guidelines for Americans, 2010  USDA's MyPlate  ASA Prophylaxis  Lung CA " Screening    Juliette Champagne MD  [facility]    Identified Health Risks:

## 2019-08-26 ENCOUNTER — OFFICE VISIT (OUTPATIENT)
Dept: FAMILY MEDICINE | Facility: CLINIC | Age: 79
End: 2019-08-26
Payer: COMMERCIAL

## 2019-08-26 VITALS
HEIGHT: 64 IN | OXYGEN SATURATION: 96 % | HEART RATE: 71 BPM | WEIGHT: 191.3 LBS | BODY MASS INDEX: 32.66 KG/M2 | TEMPERATURE: 97.6 F | DIASTOLIC BLOOD PRESSURE: 74 MMHG | SYSTOLIC BLOOD PRESSURE: 138 MMHG

## 2019-08-26 DIAGNOSIS — H90.3 BILATERAL SENSORINEURAL HEARING LOSS: Primary | ICD-10-CM

## 2019-08-26 DIAGNOSIS — I10 BENIGN HYPERTENSION: Chronic | ICD-10-CM

## 2019-08-26 DIAGNOSIS — H65.93 MIDDLE EAR EFFUSION, BILATERAL: ICD-10-CM

## 2019-08-26 DIAGNOSIS — E78.5 HYPERLIPIDEMIA LDL GOAL <160: Chronic | ICD-10-CM

## 2019-08-26 PROCEDURE — 99214 OFFICE O/P EST MOD 30 MIN: CPT | Performed by: INTERNAL MEDICINE

## 2019-08-26 RX ORDER — PREDNISONE 20 MG/1
20 TABLET ORAL 2 TIMES DAILY
Qty: 10 TABLET | Refills: 1 | Status: SHIPPED | OUTPATIENT
Start: 2019-08-26 | End: 2019-08-31

## 2019-08-26 ASSESSMENT — MIFFLIN-ST. JEOR: SCORE: 1342.73

## 2019-08-26 NOTE — PROGRESS NOTES
Chief Complaint:   Rosa Cannon is a 78 year old female who presents to clinic today for the following health issues:    Hearing problem has gotten worse this past month    HPI:   Patient Rosa Cannon is a very pleasant 78 year old female with history of hypertension, hyperlipidemia, hearing loss in both ears who presents to Internal Medicine clinic today for follow up of multiple concerns including worsening hearing loss symptoms in both ears following recent URI episode resulting in middle ear effusion symptoms in both ears. Regarding the patient's current ear problems and chronic hearing loss, the patient is interested in an evaluation by the [facility] otolaryngology clinic going forward. No chest pain, headaches, fever or chills. Regarding the patient's chronic hypertension, the patient's BP is well controlled.           Current Medications:     Current Outpatient Medications   Medication Sig Dispense Refill     metoprolol tartrate (LOPRESSOR) 50 MG tablet Take 1 tablet (50 mg) by mouth 2 times daily 180 tablet 3     predniSONE (DELTASONE) 20 MG tablet Take 1 tablet (20 mg) by mouth 2 times daily for 5 days 10 tablet 1         Allergies:      Allergies   Allergen Reactions     Apple [Pectin]      Gas - heartburn      Pollen [Pollen Extract]      Seasonal allergies      Sulfa Drugs Hives     Unknown, maybe Hives - childhood             Past Medical History:     Past Medical History:   Diagnosis Date     Allergic rhinitis, cause unspecified     eye issues - only a spring allergy - have been on rx for meds - allergra - march to may, patanol in the past      ASYMPTOMATIC VARICOSE VEINS spider type R ankle/ft 2/28/2006     Disorder of bone and cartilage, unspecified 2003     osteopenia of the femoral neck     Fam hx-cardiovas dis NEC     mother and father and brother - CAD      FEMALE GENITAL SYMPTOMS protruding vaginal tissue  2/28/2006     HTN (hypertension)      Hyperlipidemia LDL goal <160      Obesity,  unspecified     long standing problem - no signficant trials of weight loss in the past      Personal history of colonic polyps 5/2003    10 years - benign polyp only      Positive FIT (fecal immunochemical test) Jan 2015    Tubular adenoma of colon following result --> f/u colonoscopy 2020     ROUTINE GYN EXAMINATION 4/14/2007    3/06 [facility] - Dr Jones - pap smear negative     Unspecified cataract 2006    right eye - light and darkness only now - eye doctor once a year - Waldemar Good     Unspecified retinal detachment 1985    right eye - retinal detachment and cataracts      Unspecified visual loss 1985    right eye - light and dark only, membrane detached after - retinal specialist - nothing else surgically to fix it      URINARY SYS SYMPTOM NEC - urine leakage  2/28/2006    pad liner stable         Past Surgical History:     Past Surgical History:   Procedure Laterality Date     C NONSPECIFIC PROCEDURE  2000    R cataract IOL     C NONSPECIFIC PROCEDURE  4/1985    detached retina repair     C NONSPECIFIC PROCEDURE  1940s     tonsillectomy     C NONSPECIFIC PROCEDURE  2000s    blephroplasty - both eyes      HC COLONOSCOPY THRU STOMA W BIOPSY/CAUTERY TUMOR/POLYP/LESION  5/2003    benign polyps - 2013 recommended          Family Medical History:     Family History   Problem Relation Age of Onset     Heart Disease Mother         unknown heart problem - angina in her 60s      Hypertension Mother      Heart Disease Father         cardiomegaly - mi as well - mi in his 50s      Diabetes Father         ? possible diagnosis - last few years of life      Heart Disease Brother         mi at 52 had another MI at 60     Heart Disease Brother         stent at 56     Heart Disease Brother         hypertention     Family History Negative Daughter      Family History Negative Daughter      Family History Negative Daughter      Cancer - colorectal No family hx of          Social History:     Social History      Socioeconomic History     Marital status:      Spouse name: Geronimo     Number of children: 3     Years of education: Not on file     Highest education level: Not on file   Occupational History     Occupation:      Comment: 25 hours per week   Social Needs     Financial resource strain: Not on file     Food insecurity:     Worry: Not on file     Inability: Not on file     Transportation needs:     Medical: Not on file     Non-medical: Not on file   Tobacco Use     Smoking status: Never Smoker     Smokeless tobacco: Never Used   Substance and Sexual Activity     Alcohol use: Yes     Alcohol/week: 0.0 oz     Comment: occasional glass of wine  q 2 weeks - 1 month      Drug use: No     Sexual activity: Yes     Partners: Male     Comment:  - Geronimo 38 years together, 3 children 4 grandchildren   Lifestyle     Physical activity:     Days per week: Not on file     Minutes per session: Not on file     Stress: Not on file   Relationships     Social connections:     Talks on phone: Not on file     Gets together: Not on file     Attends Restorationist service: Not on file     Active member of club or organization: Not on file     Attends meetings of clubs or organizations: Not on file     Relationship status: Not on file     Intimate partner violence:     Fear of current or ex partner: Not on file     Emotionally abused: Not on file     Physically abused: Not on file     Forced sexual activity: Not on file   Other Topics Concern      Service No     Blood Transfusions No     Caffeine Concern No     Comment: 3 to 4 per day     Occupational Exposure No     Hobby Hazards No     Sleep Concern Yes     Comment: occasionally     Stress Concern No     Weight Concern Yes     Special Diet Yes     Comment: avoids cheeses, mayonaise because diarrhea, apples     Back Care No     Exercise Yes     Comment: walks for 40 to 60 minutes 3 times per week.     Bike Helmet No     Comment:       Seat Belt Yes      "Self-Exams Yes     Comment: BSE monthly   Social History Narrative    1/2013        Children-3    Work- sec    Tobacco- none    ETOH- wine  2/month    Exercise- 45 min 4 /week                   Review of System:     Constitutional: Negative for fever or chills  Skin: Negative for rashes  Ears/Nose/Throat: Negative for nasal congestion, sore throat, positive for middle ear effusion symptoms present in both ears  Respiratory: No shortness of breath, dyspnea on exertion, cough, or hemoptysis  Cardiovascular: Negative for chest pain  Gastrointestinal: Negative for nausea, vomiting  Genitourinary: Negative for dysuria, hematuria  Musculoskeletal: Negative for myalgias  Neurologic: Negative for headaches, positive for chronic hearing loss in both ears  Psychiatric: Negative for depression, anxiety  Hematologic/Lymphatic/Immunologic: Negative  Endocrine: Negative  Behavioral: Negative for tobacco use       Physical Exam:   /74 (BP Location: Left arm, Patient Position: Sitting, Cuff Size: Adult Regular)   Pulse 71   Temp 97.6  F (36.4  C) (Oral)   Ht 1.626 m (5' 4\")   Wt 86.8 kg (191 lb 4.8 oz)   SpO2 96%   BMI 32.84 kg/m      GENERAL: alert and no distress  EYES: eyes grossly normal to inspection, and conjunctivae and sclerae normal  HENT: Normocephalic atraumatic. Nose and mouth without ulcers or lesions, middle ear effusion symptoms present in both ears  NECK: supple  RESP: lungs clear to auscultation   CV: regular rate and rhythm, normal S1 S2  LYMPH: no peripheral edema   ABDOMEN: nondistended  MS: no gross musculoskeletal defects noted  SKIN: no suspicious lesions or rashes  NEURO: Alert & Oriented x 3. Hearing loss symptoms in both ears  PSYCH: mentation appears normal, affect normal        Diagnostic Test Results:     Diagnostic Test Results:  Results for orders placed or performed in visit on 04/10/19   Lipid panel reflex to direct LDL Fasting   Result Value Ref Range    Cholesterol 210 (H) <200 " mg/dL    Triglycerides 114 <150 mg/dL    HDL Cholesterol 48 (L) >49 mg/dL    LDL Cholesterol Calculated 139 (H) <100 mg/dL    Non HDL Cholesterol 162 (H) <130 mg/dL   CBC with platelets and differential   Result Value Ref Range    WBC 6.4 4.0 - 11.0 10e9/L    RBC Count 4.23 3.8 - 5.2 10e12/L    Hemoglobin 12.8 11.7 - 15.7 g/dL    Hematocrit 39.6 35.0 - 47.0 %    MCV 94 78 - 100 fl    MCH 30.3 26.5 - 33.0 pg    MCHC 32.3 31.5 - 36.5 g/dL    RDW 14.9 10.0 - 15.0 %    Platelet Count 186 150 - 450 10e9/L    % Neutrophils 67.9 %    % Lymphocytes 23.6 %    % Monocytes 6.4 %    % Eosinophils 1.9 %    % Basophils 0.2 %    Absolute Neutrophil 4.4 1.6 - 8.3 10e9/L    Absolute Lymphocytes 1.5 0.8 - 5.3 10e9/L    Absolute Monocytes 0.4 0.0 - 1.3 10e9/L    Absolute Eosinophils 0.1 0.0 - 0.7 10e9/L    Absolute Basophils 0.0 0.0 - 0.2 10e9/L    Diff Method Automated Method    Basic metabolic panel  (Ca, Cl, CO2, Creat, Gluc, K, Na, BUN)   Result Value Ref Range    Sodium 139 133 - 144 mmol/L    Potassium 4.4 3.4 - 5.3 mmol/L    Chloride 106 94 - 109 mmol/L    Carbon Dioxide 25 20 - 32 mmol/L    Anion Gap 8 3 - 14 mmol/L    Glucose 97 70 - 99 mg/dL    Urea Nitrogen 14 7 - 30 mg/dL    Creatinine 0.77 0.52 - 1.04 mg/dL    GFR Estimate 75 >60 mL/min/[1.73_m2]    GFR Estimate If Black 87 >60 mL/min/[1.73_m2]    Calcium 9.4 8.5 - 10.1 mg/dL       ASSESSMENT/PLAN:       (H90.3) Bilateral sensorineural hearing loss  (primary encounter diagnosis)  (H65.93) Middle ear effusion, bilateral  Comment: middle ear effusions in both ears interfering with hearing  Plan: OTOLARYNGOLOGY REFERRAL, predniSONE (DELTASONE)        20 MG tablet      (E78.5) Hyperlipidemia LDL goal <160  Comment: currently on diet and exercise treatment  Plan: continue current diet and exercise treatment of hyperlipidemia going forward      (I10) Benign hypertension; goal < 150/90  Comment: BP is well controlled  Plan: continue current BP medication regimen.        Follow  Up Plan:     Patient is instructed to return to Internal Medicine clinic for follow-up visit in 1 week.        Juliette Champagne MD  Internal Medicine  [facility]

## 2019-08-30 ENCOUNTER — TRANSFERRED RECORDS (OUTPATIENT)
Dept: HEALTH INFORMATION MANAGEMENT | Facility: CLINIC | Age: 79
End: 2019-08-30

## 2019-09-20 ENCOUNTER — TRANSFERRED RECORDS (OUTPATIENT)
Dept: HEALTH INFORMATION MANAGEMENT | Facility: CLINIC | Age: 79
End: 2019-09-20

## 2019-11-04 ENCOUNTER — HEALTH MAINTENANCE LETTER (OUTPATIENT)
Age: 79
End: 2019-11-04

## 2020-04-06 ENCOUNTER — VIRTUAL VISIT (OUTPATIENT)
Dept: FAMILY MEDICINE | Facility: CLINIC | Age: 80
End: 2020-04-06
Payer: COMMERCIAL

## 2020-04-06 DIAGNOSIS — Z76.0 ENCOUNTER FOR MEDICATION REFILL: Primary | ICD-10-CM

## 2020-04-06 DIAGNOSIS — I10 BENIGN HYPERTENSION: Chronic | ICD-10-CM

## 2020-04-06 DIAGNOSIS — E78.5 HYPERLIPIDEMIA LDL GOAL <160: Chronic | ICD-10-CM

## 2020-04-06 PROCEDURE — 99213 OFFICE O/P EST LOW 20 MIN: CPT | Mod: TEL | Performed by: INTERNAL MEDICINE

## 2020-04-06 RX ORDER — METOPROLOL TARTRATE 50 MG
50 TABLET ORAL 2 TIMES DAILY
Qty: 180 TABLET | Refills: 3 | Status: SHIPPED | OUTPATIENT
Start: 2020-04-06 | End: 2021-04-05

## 2020-04-06 NOTE — PROGRESS NOTES
"Rosa Cannon is a 79 year old female who is being evaluated via a telephone visit.      The patient has been notified of following (by STERLING Mcgovern CMA on 4/6/2020 at 9:38 AM       \"We have found that certain health care needs can be provided without the need for a physical exam.  This service lets us provide the care you need with a short phone conversation.  If a prescription is necessary we can send it directly to your pharmacy.  If lab work is needed we can place an order for that and you can then stop by our lab to have the test done at a later time.    This telephone visit will be conducted via 3 way call with the you (the patient) , the physician/provider, and a me all on the line at the same time.  This allows your physician/provider to have the phone conversation with you while I will be taking notes for your medical record.  We will have full access to your [facility] medical record during this entire phone call.    Since this is like an office visit,  will bill your insurance company for this service.  Please check with your medical insurance if this type of telephone/virtual is covered . You may be responsible for the cost of this service if insurance coverage is denied.  The typical cost is $30 (10min), $59(11-20min) and $85 (21-30min)     If during the course of the call the physician/provider feels a telephone visit is not appropriate, you will not be charged for this service\"    Consent has been obtained for this service by care team member: yes.  See the scanned image in the medical record.    S: The history as provided by the patient to the provider during this 3 way call include follow up on hypertension, hyperlipidemia medication refill    Pertinent parts of the the patient's medical history reviewed and confirmed by the provider included: follow up on hypertension, hyperlipidemia, medication refill    Total time of call between patient and provider was 25 minutes     Juliette " Ihsan (MD signature)  ===================================================    I have reviewed the note as documented above.  This accurately captures the substance of my conversation with the patient,    Additional provider notes:    Chief Complaint:      follow up on hypertension, hyperlipidemia, medication refill    HPI:   Patient Rosa Cannon is a very pleasant 79 year old female with history of hypertension, hyperlipidemia today for telephone visit for follow up of hypertension, hyperlipidemia, and medication refill. Regarding the patient's hypertension, the patient reports that her BP is currently well controlled. She is due for a refill of her chronic metoprolol BP medication at this time. No chest pain, headaches, fever or chills. No cough or any recent URI symptoms.    Current Medications:     Current Outpatient Medications   Medication Sig Dispense Refill     metoprolol tartrate (LOPRESSOR) 50 MG tablet Take 1 tablet (50 mg) by mouth 2 times daily 180 tablet 3         Allergies:      Allergies   Allergen Reactions     Apple [Pectin]      Gas - heartburn      Pollen [Pollen Extract]      Seasonal allergies      Sulfa Drugs Hives     Unknown, maybe Hives - childhood             Past Medical History:     Past Medical History:   Diagnosis Date     Allergic rhinitis, cause unspecified     eye issues - only a spring allergy - have been on rx for meds - allergra - march to may, patanol in the past      ASYMPTOMATIC VARICOSE VEINS spider type R ankle/ft 2/28/2006     Disorder of bone and cartilage, unspecified 2003     osteopenia of the femoral neck     Fam hx-cardiovas dis NEC     mother and father and brother - CAD      FEMALE GENITAL SYMPTOMS protruding vaginal tissue  2/28/2006     HTN (hypertension)      Hyperlipidemia LDL goal <160      Obesity, unspecified     long standing problem - no signficant trials of weight loss in the past      Personal history of colonic polyps 5/2003    10 years - benign polyp  only      Positive FIT (fecal immunochemical test) Jan 2015    Tubular adenoma of colon following result --> f/u colonoscopy 2020     ROUTINE GYN EXAMINATION 4/14/2007    3/06 [facility] - Dr Jones - pap smear negative     Unspecified cataract 2006    right eye - light and darkness only now - eye doctor once a year - Waldemar Good     Unspecified retinal detachment 1985    right eye - retinal detachment and cataracts      Unspecified visual loss 1985    right eye - light and dark only, membrane detached after - retinal specialist - nothing else surgically to fix it      URINARY SYS SYMPTOM NEC - urine leakage  2/28/2006    pad liner stable         Past Surgical History:     Past Surgical History:   Procedure Laterality Date     C NONSPECIFIC PROCEDURE  2000    R cataract IOL     C NONSPECIFIC PROCEDURE  4/1985    detached retina repair     C NONSPECIFIC PROCEDURE  1940s     tonsillectomy     C NONSPECIFIC PROCEDURE  2000s    blephroplasty - both eyes      HC COLONOSCOPY THRU STOMA W BIOPSY/CAUTERY TUMOR/POLYP/LESION  5/2003    benign polyps - 2013 recommended          Family Medical History:     Family History   Problem Relation Age of Onset     Heart Disease Mother         unknown heart problem - angina in her 60s      Hypertension Mother      Heart Disease Father         cardiomegaly - mi as well - mi in his 50s      Diabetes Father         ? possible diagnosis - last few years of life      Heart Disease Brother         mi at 52 had another MI at 60     Heart Disease Brother         stent at 56     Heart Disease Brother         hypertention     Family History Negative Daughter      Family History Negative Daughter      Family History Negative Daughter      Cancer - colorectal No family hx of          Social History:     Social History     Socioeconomic History     Marital status:      Spouse name: Geronimo     Number of children: 3     Years of education: Not on file     Highest education level: Not  on file   Occupational History     Occupation:      Comment: 25 hours per week   Social Needs     Financial resource strain: Not on file     Food insecurity     Worry: Not on file     Inability: Not on file     Transportation needs     Medical: Not on file     Non-medical: Not on file   Tobacco Use     Smoking status: Never Smoker     Smokeless tobacco: Never Used   Substance and Sexual Activity     Alcohol use: Yes     Alcohol/week: 0.0 standard drinks     Comment: occasional glass of wine  q 2 weeks - 1 month      Drug use: No     Sexual activity: Yes     Partners: Male     Comment:  - Geronimo 38 years together, 3 children 4 grandchildren   Lifestyle     Physical activity     Days per week: Not on file     Minutes per session: Not on file     Stress: Not on file   Relationships     Social connections     Talks on phone: Not on file     Gets together: Not on file     Attends Islam service: Not on file     Active member of club or organization: Not on file     Attends meetings of clubs or organizations: Not on file     Relationship status: Not on file     Intimate partner violence     Fear of current or ex partner: Not on file     Emotionally abused: Not on file     Physically abused: Not on file     Forced sexual activity: Not on file   Other Topics Concern      Service No     Blood Transfusions No     Caffeine Concern No     Comment: 3 to 4 per day     Occupational Exposure No     Hobby Hazards No     Sleep Concern Yes     Comment: occasionally     Stress Concern No     Weight Concern Yes     Special Diet Yes     Comment: avoids cheeses, mayonaise because diarrhea, apples     Back Care No     Exercise Yes     Comment: walks for 40 to 60 minutes 3 times per week.     Bike Helmet No     Comment:       Seat Belt Yes     Self-Exams Yes     Comment: BSE monthly   Social History Narrative    1/2013        Children-3    Work- sec    Tobacco- none    ETOH- wine  2/month     Exercise- 45 min 4 /week                   Review of System:     Constitutional: Negative for fever or chills  Skin: Negative for rashes  Ears/Nose/Throat: Negative for nasal congestion, sore throat  Respiratory: No shortness of breath, dyspnea on exertion, cough, or hemoptysis  Cardiovascular: Negative for chest pain  Gastrointestinal: Negative for nausea, vomiting  Genitourinary: Negative for dysuria, hematuria  Musculoskeletal: Negative for myalgias  Neurologic: Negative for headaches  Psychiatric: Negative for depression, anxiety  Hematologic/Lymphatic/Immunologic: Negative  Endocrine: Negative  Behavioral: Negative for tobacco use         Diagnostic Test Results:       Recent Labs   Lab Test 04/10/19  0939 03/01/17  0824  01/08/15  1014 11/13/13  0819   CHOL 210* 202*   < > 199 201*   HDL 48* 48*   < > 65 44*   * 132*   < > 113 131*   TRIG 114 109   < > 106 127   CHOLHDLRATIO  --   --   --  3.1 4.5    < > = values in this interval not displayed.         ASSESSMENT/PLAN:       (Z76.0) Encounter for medication refill  (primary encounter diagnosis)  (I10) Benign hypertension; goal < 150/90  Comment: BP is well controlled according to the patient's own reports. Patient is due for a refill of metoprolol BP medication at this time.  Plan: metoprolol tartrate (LOPRESSOR) 50 MG tablet       (E78.5) Hyperlipidemia LDL goal <160  Comment: stable  Plan: continue current diet and exercise treatment going forward.      Follow Up Plan:     Patient is instructed to return to Internal Medicine clinic for follow-up visit in .        Juliette Champagne MD  Internal Medicine  [facility]

## 2021-02-07 ENCOUNTER — HEALTH MAINTENANCE LETTER (OUTPATIENT)
Age: 81
End: 2021-02-07

## 2021-02-16 ENCOUNTER — IMMUNIZATION (OUTPATIENT)
Dept: NURSING | Facility: CLINIC | Age: 81
End: 2021-02-16
Payer: COMMERCIAL

## 2021-02-16 PROCEDURE — 0001A PR COVID VAC PFIZER DIL RECON 30 MCG/0.3 ML IM: CPT

## 2021-02-16 PROCEDURE — 91300 PR COVID VAC PFIZER DIL RECON 30 MCG/0.3 ML IM: CPT

## 2021-03-09 ENCOUNTER — IMMUNIZATION (OUTPATIENT)
Dept: NURSING | Facility: CLINIC | Age: 81
End: 2021-03-09
Attending: INTERNAL MEDICINE
Payer: COMMERCIAL

## 2021-03-09 PROCEDURE — 0002A PR COVID VAC PFIZER DIL RECON 30 MCG/0.3 ML IM: CPT

## 2021-03-09 PROCEDURE — 91300 PR COVID VAC PFIZER DIL RECON 30 MCG/0.3 ML IM: CPT

## 2021-03-30 DIAGNOSIS — I10 BENIGN HYPERTENSION: Chronic | ICD-10-CM

## 2021-04-01 NOTE — TELEPHONE ENCOUNTER
BP Readings from Last 3 Encounters:   08/26/19 138/74   04/10/19 149/80   05/02/18 152/78     Routing refill request to provider for review/approval because:  No recent BP reading

## 2021-04-05 RX ORDER — METOPROLOL TARTRATE 50 MG
50 TABLET ORAL 2 TIMES DAILY
Qty: 180 TABLET | Refills: 0 | Status: SHIPPED | OUTPATIENT
Start: 2021-04-05 | End: 2021-06-24

## 2021-04-05 NOTE — TELEPHONE ENCOUNTER
Pt is calling to check in on this. Has surpassed three business days since initial request and Pt is out of Rx

## 2021-06-24 ENCOUNTER — OFFICE VISIT (OUTPATIENT)
Dept: FAMILY MEDICINE | Facility: CLINIC | Age: 81
End: 2021-06-24
Payer: COMMERCIAL

## 2021-06-24 VITALS
DIASTOLIC BLOOD PRESSURE: 78 MMHG | TEMPERATURE: 97.7 F | SYSTOLIC BLOOD PRESSURE: 178 MMHG | OXYGEN SATURATION: 97 % | BODY MASS INDEX: 32.61 KG/M2 | HEART RATE: 76 BPM | HEIGHT: 64 IN | WEIGHT: 191 LBS

## 2021-06-24 DIAGNOSIS — I10 BENIGN HYPERTENSION: Chronic | ICD-10-CM

## 2021-06-24 DIAGNOSIS — Z00.00 WELLNESS EXAMINATION: Primary | ICD-10-CM

## 2021-06-24 LAB
ANION GAP SERPL CALCULATED.3IONS-SCNC: 2 MMOL/L (ref 3–14)
BASOPHILS # BLD AUTO: 0 10E9/L (ref 0–0.2)
BASOPHILS NFR BLD AUTO: 0.4 %
BUN SERPL-MCNC: 16 MG/DL (ref 7–30)
CALCIUM SERPL-MCNC: 9.3 MG/DL (ref 8.5–10.1)
CHLORIDE SERPL-SCNC: 107 MMOL/L (ref 94–109)
CHOLEST SERPL-MCNC: 216 MG/DL
CO2 SERPL-SCNC: 28 MMOL/L (ref 20–32)
CREAT SERPL-MCNC: 0.82 MG/DL (ref 0.52–1.04)
DIFFERENTIAL METHOD BLD: NORMAL
EOSINOPHIL # BLD AUTO: 0.1 10E9/L (ref 0–0.7)
EOSINOPHIL NFR BLD AUTO: 2.1 %
ERYTHROCYTE [DISTWIDTH] IN BLOOD BY AUTOMATED COUNT: 14.2 % (ref 10–15)
GFR SERPL CREATININE-BSD FRML MDRD: 68 ML/MIN/{1.73_M2}
GLUCOSE SERPL-MCNC: 91 MG/DL (ref 70–99)
HCT VFR BLD AUTO: 37.8 % (ref 35–47)
HDLC SERPL-MCNC: 46 MG/DL
HGB BLD-MCNC: 12.4 G/DL (ref 11.7–15.7)
LDLC SERPL CALC-MCNC: 141 MG/DL
LYMPHOCYTES # BLD AUTO: 1.4 10E9/L (ref 0.8–5.3)
LYMPHOCYTES NFR BLD AUTO: 25.8 %
MCH RBC QN AUTO: 30.2 PG (ref 26.5–33)
MCHC RBC AUTO-ENTMCNC: 32.8 G/DL (ref 31.5–36.5)
MCV RBC AUTO: 92 FL (ref 78–100)
MONOCYTES # BLD AUTO: 0.5 10E9/L (ref 0–1.3)
MONOCYTES NFR BLD AUTO: 8.1 %
NEUTROPHILS # BLD AUTO: 3.6 10E9/L (ref 1.6–8.3)
NEUTROPHILS NFR BLD AUTO: 63.6 %
NONHDLC SERPL-MCNC: 170 MG/DL
PLATELET # BLD AUTO: 178 10E9/L (ref 150–450)
POTASSIUM SERPL-SCNC: 4.4 MMOL/L (ref 3.4–5.3)
RBC # BLD AUTO: 4.11 10E12/L (ref 3.8–5.2)
SODIUM SERPL-SCNC: 137 MMOL/L (ref 133–144)
TRIGL SERPL-MCNC: 145 MG/DL
WBC # BLD AUTO: 5.6 10E9/L (ref 4–11)

## 2021-06-24 PROCEDURE — 99397 PER PM REEVAL EST PAT 65+ YR: CPT | Performed by: INTERNAL MEDICINE

## 2021-06-24 PROCEDURE — 36415 COLL VENOUS BLD VENIPUNCTURE: CPT | Performed by: INTERNAL MEDICINE

## 2021-06-24 PROCEDURE — 80048 BASIC METABOLIC PNL TOTAL CA: CPT | Performed by: INTERNAL MEDICINE

## 2021-06-24 PROCEDURE — 85025 COMPLETE CBC W/AUTO DIFF WBC: CPT | Performed by: INTERNAL MEDICINE

## 2021-06-24 PROCEDURE — 80061 LIPID PANEL: CPT | Performed by: INTERNAL MEDICINE

## 2021-06-24 RX ORDER — METOPROLOL TARTRATE 50 MG
50 TABLET ORAL 2 TIMES DAILY
Qty: 180 TABLET | Refills: 3 | Status: SHIPPED | OUTPATIENT
Start: 2021-06-24 | End: 2022-06-09

## 2021-06-24 RX ORDER — AMLODIPINE BESYLATE 5 MG/1
5 TABLET ORAL DAILY
Qty: 90 TABLET | Refills: 3 | Status: SHIPPED | OUTPATIENT
Start: 2021-06-24 | End: 2022-06-09

## 2021-06-24 ASSESSMENT — ENCOUNTER SYMPTOMS
COUGH: 0
PALPITATIONS: 0
CHILLS: 0
ARTHRALGIAS: 0
DIARRHEA: 0
BREAST MASS: 0
DIZZINESS: 0
FEVER: 0
NAUSEA: 0
MYALGIAS: 0
FREQUENCY: 0
SORE THROAT: 0
NERVOUS/ANXIOUS: 0
JOINT SWELLING: 0
DYSURIA: 0
EYE PAIN: 0
ABDOMINAL PAIN: 0
HEARTBURN: 0
HEMATURIA: 0
CONSTIPATION: 0
SHORTNESS OF BREATH: 0
PARESTHESIAS: 0
WEAKNESS: 0
HEADACHES: 0
HEMATOCHEZIA: 0

## 2021-06-24 ASSESSMENT — ACTIVITIES OF DAILY LIVING (ADL): CURRENT_FUNCTION: NO ASSISTANCE NEEDED

## 2021-06-24 ASSESSMENT — MIFFLIN-ST. JEOR: SCORE: 1331.37

## 2021-06-24 NOTE — PROGRESS NOTES
"SUBJECTIVE:   Rosa Cannon is a 80 year old female who presents for Preventive Visit.    Patient has been advised of split billing requirements and indicates understanding: Yes   Are you in the first 12 months of your Medicare coverage?  No    Healthy Habits:     In general, how would you rate your overall health?  Good    Frequency of exercise:  4-5 days/week    Duration of exercise:  30-45 minutes    Do you usually eat at least 4 servings of fruit and vegetables a day, include whole grains    & fiber and avoid regularly eating high fat or \"junk\" foods?  Yes    Taking medications regularly:  Yes    Medication side effects:  None    Ability to successfully perform activities of daily living:  No assistance needed    Home Safety:  No safety concerns identified    Hearing Impairment:  Difficulty following a conversation in a noisy restaurant or crowded room and need to ask people to speak up or repeat themselves    In the past 6 months, have you been bothered by leaking of urine? Yes    In general, how would you rate your overall mental or emotional health?  Good      PHQ-2 Total Score: 0    Additional concerns today:  No    Do you feel safe in your environment? Yes    Have you ever done Advance Care Planning? (For example, a Health Directive, POLST, or a discussion with a medical provider or your loved ones about your wishes): Yes, advance care planning is on file.    Fall risk       Cognitive Screening   1) Repeat 3 items (Leader, Season, Table)    2) Clock draw: NORMAL  3) 3 item recall: Recalls 3 objects  Results: 3 items recalled: COGNITIVE IMPAIRMENT LESS LIKELY    Mini-CogTM Copyright WESLEY Moyer. Licensed by the author for use in [facility]; reprinted with permission (nu@.[facility]). All rights reserved.      Do you have sleep apnea, excessive snoring or daytime drowsiness?: no    Reviewed and updated as needed this visit by clinical staff                 Reviewed and updated as needed this visit " "by Provider                Social History     Tobacco Use     Smoking status: Never Smoker     Smokeless tobacco: Never Used   Substance Use Topics     Alcohol use: Yes     Alcohol/week: 0.0 standard drinks     Comment: occasional glass of wine  q 2 weeks - 1 month      If you drink alcohol do you typically have >3 drinks per day or >7 drinks per week? No    Alcohol Use 6/24/2021   Prescreen: >3 drinks/day or >7 drinks/week? No   Prescreen: >3 drinks/day or >7 drinks/week? -         Current providers sharing in care for this patient include:   Patient Care Team:  Juliette Champagne MD as PCP - General (Internal Medicine)  Ciara Christie as   Juliette Champagne MD as Assigned PCP    The following health maintenance items are reviewed in Epic and correct as of today:  Health Maintenance Due   Topic Date Due     ANNUAL REVIEW OF  ORDERS  Never done     HEPATITIS C SCREENING  Never done     DTAP/TDAP/TD IMMUNIZATION (1 - Tdap) 10/05/1967     ZOSTER IMMUNIZATION (2 of 3) 03/11/2012     DEXA  01/14/2018     MAMMO SCREENING  02/07/2019     COLORECTAL CANCER SCREENING  03/20/2020     MEDICARE ANNUAL WELLNESS VISIT  04/10/2020     PHQ-2  01/01/2021     ADVANCE CARE PLANNING  02/28/2021     FALL RISK ASSESSMENT  04/06/2021     Labs reviewed in EPIC      Pertinent mammograms are reviewed under the imaging tab.    Review of Systems  Constitutional, HEENT, cardiovascular, pulmonary, GI, , musculoskeletal, neuro, skin, endocrine and psych systems are negative, except as otherwise noted.    OBJECTIVE:   BP (!) 178/78 (BP Location: Left arm, Patient Position: Sitting, Cuff Size: Adult Large)   Pulse 76   Temp 97.7  F (36.5  C) (Temporal)   Ht 1.626 m (5' 4\")   Wt 86.6 kg (191 lb)   SpO2 97%   BMI 32.79 kg/m   Estimated body mass index is 32.84 kg/m  as calculated from the following:    Height as of 8/26/19: 1.626 m (5' 4\").    Weight as of 8/26/19: 86.8 kg (191 lb 4.8 oz).  Physical Exam  GENERAL: alert and " "no distress  EYES: Eyes grossly normal to inspection, PERRL and conjunctivae and sclerae normal  HENT: ear canals and TM's normal, nose and mouth without ulcers or lesions  NECK: no adenopathy, no asymmetry, masses, or scars and thyroid normal to palpation  RESP: lungs clear to auscultation - no rales, rhonchi or wheezes  CV: regular rate and rhythm, normal S1 S2, no S3 or S4, no murmur, click or rub, no peripheral edema and peripheral pulses strong  ABDOMEN: soft, nontender, no hepatosplenomegaly, no masses and bowel sounds normal  MS: no gross musculoskeletal defects noted, no edema  SKIN: no suspicious lesions or rashes  NEURO: Normal strength and tone, mentation intact and speech normal  PSYCH: mentation appears normal, affect normal/bright    Diagnostic Test Results:  Labs reviewed in Epic    ASSESSMENT / PLAN:   (Z00.00) Wellness examination  (primary encounter diagnosis)  (I10) Benign hypertension; goal < 150/90  Comment: BP not at goal  Plan: metoprolol tartrate (LOPRESSOR) 50 MG tablet,         Basic metabolic panel  (Ca, Cl, CO2, Creat,         Gluc, K, Na, BUN), CBC with platelets and         differential, Lipid panel reflex to direct LDL         Fasting, amLODIPine (NORVASC) 5 MG tablet      Patient has been advised of split billing requirements and indicates understanding: Yes  COUNSELING:  Reviewed preventive health counseling, as reflected in patient instructions  Special attention given to:       Regular exercise       Healthy diet/nutrition    Estimated body mass index is 32.84 kg/m  as calculated from the following:    Height as of 8/26/19: 1.626 m (5' 4\").    Weight as of 8/26/19: 86.8 kg (191 lb 4.8 oz).    Weight management plan: Discussed healthy diet and exercise guidelines    She reports that she has never smoked. She has never used smokeless tobacco.      Appropriate preventive services were discussed with this patient, including applicable screening as appropriate for cardiovascular " disease, diabetes, osteopenia/osteoporosis, and glaucoma.  As appropriate for age/gender, discussed screening for colorectal cancer, prostate cancer, breast cancer, and cervical cancer. Checklist reviewing preventive services available has been given to the patient.    Reviewed patients plan of care and provided an AVS. The Basic Care Plan (routine screening as documented in Health Maintenance) for Rosa meets the Care Plan requirement. This Care Plan has been established and reviewed with the Patient.    Counseling Resources:  ATP IV Guidelines  Pooled Cohorts Equation Calculator  Breast Cancer Risk Calculator  Breast Cancer: Medication to Reduce Risk  FRAX Risk Assessment  ICSI Preventive Guidelines  Dietary Guidelines for Americans, 2010  USDA's MyPlate  ASA Prophylaxis  Lung CA Screening    Juliette Champagne MD  [facility]    Identified Health Risks:

## 2021-06-24 NOTE — LETTER
June 28, 2021      Rosa J Davis  [address] [place] [address]        Dear ,    We are writing to inform you of your test results.    Your labs came back within normal range, except for mild hyperlipidemia, I advise diet and exercise . No change in medications at this time.                                                             Resulted Orders   Basic metabolic panel  (Ca, Cl, CO2, Creat, Gluc, K, Na, BUN)   Result Value Ref Range    Sodium 137 133 - 144 mmol/L    Potassium 4.4 3.4 - 5.3 mmol/L    Chloride 107 94 - 109 mmol/L    Carbon Dioxide 28 20 - 32 mmol/L    Anion Gap 2 (L) 3 - 14 mmol/L    Glucose 91 70 - 99 mg/dL      Comment:      Non Fasting    Urea Nitrogen 16 7 - 30 mg/dL    Creatinine 0.82 0.52 - 1.04 mg/dL    GFR Estimate 68 >60 mL/min/[1.73_m2]      Comment:      Non  GFR Calc  Starting 12/18/2018, serum creatinine based estimated GFR (eGFR) will be   calculated using the Chronic Kidney Disease Epidemiology Collaboration   (CKD-EPI) equation.      GFR Estimate If Black 79 >60 mL/min/[1.73_m2]      Comment:       GFR Calc  Starting 12/18/2018, serum creatinine based estimated GFR (eGFR) will be   calculated using the Chronic Kidney Disease Epidemiology Collaboration   (CKD-EPI) equation.      Calcium 9.3 8.5 - 10.1 mg/dL   CBC with platelets and differential   Result Value Ref Range    WBC 5.6 4.0 - 11.0 10e9/L    RBC Count 4.11 3.8 - 5.2 10e12/L    Hemoglobin 12.4 11.7 - 15.7 g/dL    Hematocrit 37.8 35.0 - 47.0 %    MCV 92 78 - 100 fl    MCH 30.2 26.5 - 33.0 pg    MCHC 32.8 31.5 - 36.5 g/dL    RDW 14.2 10.0 - 15.0 %    Platelet Count 178 150 - 450 10e9/L    % Neutrophils 63.6 %    % Lymphocytes 25.8 %    % Monocytes 8.1 %    % Eosinophils 2.1 %    % Basophils 0.4 %    Absolute Neutrophil 3.6 1.6 - 8.3 10e9/L    Absolute Lymphocytes 1.4 0.8 - 5.3 10e9/L    Absolute Monocytes 0.5 0.0 - 1.3 10e9/L    Absolute Eosinophils 0.1 0.0 - 0.7  10e9/L    Absolute Basophils 0.0 0.0 - 0.2 10e9/L    Diff Method Automated Method    Lipid panel reflex to direct LDL Fasting   Result Value Ref Range    Cholesterol 216 (H) <200 mg/dL      Comment:      Desirable:       <200 mg/dl    Triglycerides 145 <150 mg/dL      Comment:      Non Fasting    HDL Cholesterol 46 (L) >49 mg/dL    LDL Cholesterol Calculated 141 (H) <100 mg/dL      Comment:      Above desirable:  100-129 mg/dl  Borderline High:  130-159 mg/dL  High:             160-189 mg/dL  Very high:       >189 mg/dl      Non HDL Cholesterol 170 (H) <130 mg/dL      Comment:      Above Desirable:  130-159 mg/dl  Borderline high:  160-189 mg/dl  High:             190-219 mg/dl  Very high:       >219 mg/dl         If you have any questions or concerns, please call the clinic at the number listed above.       Sincerely,      Juliette Champagne MD

## 2021-07-02 ENCOUNTER — TELEPHONE (OUTPATIENT)
Dept: FAMILY MEDICINE | Facility: CLINIC | Age: 81
End: 2021-07-02
Payer: COMMERCIAL

## 2021-07-02 NOTE — TELEPHONE ENCOUNTER
Forms/Letter Request    Name of form/letter: $25 gift card for AWV    Have you been seen for this request: Yes px    Do we have the form/letter: Yes: at FD    When is form/letter needed by: asap    How would you like the form/letter returned: Mail    Patient Notified form requests are processed in 3-5 business days:Yes    Okay to leave a detailed message? Yes Cell number on file:    Telephone Information:   Mobile [phone]

## 2021-07-05 NOTE — TELEPHONE ENCOUNTER
"Dr. Champagne gave the forms to the . He ask me to call the Pt to come pick the forms. But they stated that they want use to mail it for them. I have put \"Both mail in the Sending out mail box\"   "

## 2021-09-18 ENCOUNTER — HEALTH MAINTENANCE LETTER (OUTPATIENT)
Age: 81
End: 2021-09-18

## 2022-03-05 ENCOUNTER — HEALTH MAINTENANCE LETTER (OUTPATIENT)
Age: 82
End: 2022-03-05

## 2022-07-18 ENCOUNTER — OFFICE VISIT (OUTPATIENT)
Dept: FAMILY MEDICINE | Facility: CLINIC | Age: 82
End: 2022-07-18
Payer: COMMERCIAL

## 2022-07-18 VITALS
SYSTOLIC BLOOD PRESSURE: 133 MMHG | OXYGEN SATURATION: 98 % | HEART RATE: 71 BPM | DIASTOLIC BLOOD PRESSURE: 75 MMHG | BODY MASS INDEX: 32.44 KG/M2 | HEIGHT: 64 IN | TEMPERATURE: 96.9 F | WEIGHT: 190 LBS | RESPIRATION RATE: 18 BRPM

## 2022-07-18 DIAGNOSIS — Z11.59 NEED FOR HEPATITIS C SCREENING TEST: ICD-10-CM

## 2022-07-18 DIAGNOSIS — H61.23 BILATERAL IMPACTED CERUMEN: ICD-10-CM

## 2022-07-18 DIAGNOSIS — Z00.00 WELLNESS EXAMINATION: Primary | ICD-10-CM

## 2022-07-18 DIAGNOSIS — Z12.31 VISIT FOR SCREENING MAMMOGRAM: ICD-10-CM

## 2022-07-18 DIAGNOSIS — Z23 NEED FOR VACCINATION: ICD-10-CM

## 2022-07-18 DIAGNOSIS — Z12.11 SCREEN FOR COLON CANCER: ICD-10-CM

## 2022-07-18 PROCEDURE — 90715 TDAP VACCINE 7 YRS/> IM: CPT | Performed by: INTERNAL MEDICINE

## 2022-07-18 PROCEDURE — 90471 IMMUNIZATION ADMIN: CPT | Performed by: INTERNAL MEDICINE

## 2022-07-18 PROCEDURE — 69209 REMOVE IMPACTED EAR WAX UNI: CPT | Performed by: INTERNAL MEDICINE

## 2022-07-18 PROCEDURE — G0439 PPPS, SUBSEQ VISIT: HCPCS | Performed by: INTERNAL MEDICINE

## 2022-07-18 ASSESSMENT — ENCOUNTER SYMPTOMS
CONSTIPATION: 0
COUGH: 0
ABDOMINAL PAIN: 0
HEADACHES: 0
HEARTBURN: 0
NAUSEA: 0
JOINT SWELLING: 0
BREAST MASS: 0
FEVER: 0
SORE THROAT: 0
PALPITATIONS: 0
DYSURIA: 0
HEMATOCHEZIA: 0
SHORTNESS OF BREATH: 0
PARESTHESIAS: 0
WEAKNESS: 0
DIZZINESS: 0
EYE PAIN: 0
MYALGIAS: 0
HEMATURIA: 0
DIARRHEA: 0
ARTHRALGIAS: 0
NERVOUS/ANXIOUS: 0
CHILLS: 0
FREQUENCY: 0

## 2022-07-18 ASSESSMENT — ACTIVITIES OF DAILY LIVING (ADL): CURRENT_FUNCTION: NO ASSISTANCE NEEDED

## 2022-07-18 ASSESSMENT — PAIN SCALES - GENERAL: PAINLEVEL: NO PAIN (0)

## 2022-07-18 NOTE — PROGRESS NOTES
"SUBJECTIVE:   Rosa Cannon is a 81 year old female who presents for Preventive Visit.      Patient has been advised of split billing requirements and indicates understanding: Yes  Are you in the first 12 months of your Medicare coverage?  No    Healthy Habits:     In general, how would you rate your overall health?  Good    Frequency of exercise:  4-5 days/week    Duration of exercise:  30-45 minutes    Do you usually eat at least 4 servings of fruit and vegetables a day, include whole grains    & fiber and avoid regularly eating high fat or \"junk\" foods?  Yes    Taking medications regularly:  Yes    Barriers to taking medications:  None    Medication side effects:  Not applicable    Ability to successfully perform activities of daily living:  No assistance needed    Home Safety:  No safety concerns identified    Hearing Impairment:  Difficulty following a conversation in a noisy restaurant or crowded room, need to ask people to speak up or repeat themselves and difficulty understanding speech on the telephone    In the past 6 months, have you been bothered by leaking of urine? Yes    In general, how would you rate your overall mental or emotional health?  Good      PHQ-2 Total Score: 2    Additional concerns today:  No    Do you feel safe in your environment? Yes    Have you ever done Advance Care Planning? (For example, a Health Directive, POLST, or a discussion with a medical provider or your loved ones about your wishes): Yes, patient states has an Advance Care Planning document and will bring a copy to the clinic.      Fall risk  Fallen 2 or more times in the past year?: No  Any fall with injury in the past year?: No    Cognitive Screening   1) Repeat 3 items (Leader, Season, Table)    2) Clock draw: NORMAL  3) 3 item recall: Recalls 2 objects   Results: NORMAL clock, 1-2 items recalled: COGNITIVE IMPAIRMENT LESS LIKELY    Mini-CogTM Copyright WESLEY Moyer. Licensed by the author for use in [facility] " "Services; reprinted with permission (soob@[facility]). All rights reserved.      Do you have sleep apnea, excessive snoring or daytime drowsiness?: no    Reviewed and updated as needed this visit by clinical staff                    Reviewed and updated as needed this visit by Provider                   Social History     Tobacco Use     Smoking status: Never Smoker     Smokeless tobacco: Never Used   Substance Use Topics     Alcohol use: Yes     Alcohol/week: 0.0 standard drinks     Comment: occasional glass of wine  q 2 weeks - 1 month      If you drink alcohol do you typically have >3 drinks per day or >7 drinks per week? No    Alcohol Use 7/18/2022   Prescreen: >3 drinks/day or >7 drinks/week? No   Prescreen: >3 drinks/day or >7 drinks/week? -       Current providers sharing in care for this patient include: Patient Care Team:  Juliette Champagne MD as PCP - General (Internal Medicine)  Ciara Christie as   Juliette Champagne MD as Assigned PCP    The following health maintenance items are reviewed in Epic and correct as of today:  Health Maintenance Due   Topic Date Due     ANNUAL REVIEW OF HM ORDERS  Never done     HEPATITIS C SCREENING  Never done     DTAP/TDAP/TD IMMUNIZATION (1 - Tdap) 09/21/2000     DEXA  01/14/2018     MAMMO SCREENING  02/07/2019     COLORECTAL CANCER SCREENING  03/20/2020     PHQ-2 (once per calendar year)  01/01/2022     FALL RISK ASSESSMENT  06/24/2022     MEDICARE ANNUAL WELLNESS VISIT  06/24/2022       Pertinent mammograms are reviewed under the imaging tab.    Review of Systems  Constitutional, HEENT, cardiovascular, pulmonary, GI, , musculoskeletal, neuro, skin, endocrine and psych systems are negative, except as otherwise noted.    OBJECTIVE:   /75 (BP Location: Right arm, Patient Position: Sitting, Cuff Size: Adult Large)   Pulse 71   Temp 96.9  F (36.1  C) (Temporal)   Resp 18   Ht 1.618 m (5' 3.7\")   Wt 86.2 kg (190 lb)   SpO2 98%   BMI 32.92 kg/m   " "Estimated body mass index is 32.79 kg/m  as calculated from the following:    Height as of 6/24/21: 1.626 m (5' 4\").    Weight as of 6/24/21: 86.6 kg (191 lb).  Physical Exam  GENERAL: alert and no distress  EYES: Eyes grossly normal to inspection, PERRL and conjunctivae and sclerae normal  HENT: cerumen impaction symptoms noted in both ears, nose and mouth without ulcers or lesions  NECK: no adenopathy, no asymmetry, masses, or scars and thyroid normal to palpation  RESP: lungs clear to auscultation - no rales, rhonchi or wheezes  CV: regular rate and rhythm  ABDOMEN: soft, nontender, no hepatosplenomegaly, no masses and bowel sounds normal  MS: no gross musculoskeletal defects noted, no edema  SKIN: no suspicious lesions or rashes  NEURO: Normal strength and tone, mentation intact and speech normal  PSYCH: mentation appears normal, affect normal/bright    Diagnostic Test Results:  Labs reviewed in Epic    ASSESSMENT / PLAN:   Rosa was seen today for physical.    Diagnoses and all orders for this visit:    Wellness examination    Need for hepatitis C screening test  -     Hepatitis C Screen Reflex to HCV RNA Quant and Genotype; Future    Visit for screening mammogram  -     MA SCREENING DIGITAL BILAT - Future  (s+30); Future    Screen for colon cancer    Bilateral impacted cerumen  -     REMOVE IMPACTED CERUMEN  - patient's TM's are clear, visible in both ears after cerumen removal in clinic today    Need for vaccination  -     TDAP VACCINE (Adacel, Boostrix)    Other orders  -     REVIEW OF HEALTH MAINTENANCE PROTOCOL ORDERS        Patient has been advised of split billing requirements and indicates understanding: Yes    COUNSELING:  Reviewed preventive health counseling, as reflected in patient instructions  Special attention given to:       Regular exercise       Healthy diet/nutrition    Estimated body mass index is 32.79 kg/m  as calculated from the following:    Height as of 6/24/21: 1.626 m (5' 4\").    " Weight as of 6/24/21: 86.6 kg (191 lb).    Weight management plan: Discussed healthy diet and exercise guidelines    She reports that she has never smoked. She has never used smokeless tobacco.      Appropriate preventive services were discussed with this patient, including applicable screening as appropriate for cardiovascular disease, diabetes, osteopenia/osteoporosis, and glaucoma.  As appropriate for age/gender, discussed screening for colorectal cancer, prostate cancer, breast cancer, and cervical cancer. Checklist reviewing preventive services available has been given to the patient.    Reviewed patients plan of care and provided an AVS. The Basic Care Plan (routine screening as documented in Health Maintenance) for Rosa meets the Care Plan requirement. This Care Plan has been established and reviewed with the Patient.    Counseling Resources:  ATP IV Guidelines  Pooled Cohorts Equation Calculator  Breast Cancer Risk Calculator  Breast Cancer: Medication to Reduce Risk  FRAX Risk Assessment  ICSI Preventive Guidelines  Dietary Guidelines for Americans, 2010  USDA's MyPlate  ASA Prophylaxis  Lung CA Screening    Juliette Champagne MD  [facility]    Identified Health Risks:

## 2022-07-18 NOTE — NURSING NOTE
Prior to immunization administration, verified patients identity using patient s name and date of birth. Please see Immunization Activity for additional information.     Screening Questionnaire for Adult Immunization    Are you sick today?   No   Do you have allergies to medications, food, a vaccine component or latex?   Yes   Have you ever had a serious reaction after receiving a vaccination?   No   Do you have a long-term health problem with heart, lung, kidney, or metabolic disease (e.g., diabetes), asthma, a blood disorder, no spleen, complement component deficiency, a cochlear implant, or a spinal fluid leak?  Are you on long-term aspirin therapy?   No   Do you have cancer, leukemia, HIV/AIDS, or any other immune system problem?   No   Do you have a parent, brother, or sister with an immune system problem?   No   In the past 3 months, have you taken medications that affect  your immune system, such as prednisone, other steroids, or anticancer drugs; drugs for the treatment of rheumatoid arthritis, Crohn s disease, or psoriasis; or have you had radiation treatments?   No   Have you had a seizure, or a brain or other nervous system problem?   No   During the past year, have you received a transfusion of blood or blood    products, or been given immune (gamma) globulin or antiviral drug?   No   For women: Are you pregnant or is there a chance you could become       pregnant during the next month?   No   Have you received any vaccinations in the past 4 weeks?   No     Immunization questionnaire was positive for at least one answer.  Notified .        Per orders of Dr. Champagne, injection of Tdap given by Jael Lane CMA. Patient instructed to remain in clinic for 15 minutes afterwards, and to report any adverse reaction to me immediately.       Screening performed by Jael Lane CMA on 7/18/2022 at 10:52 AM.

## 2022-07-18 NOTE — NURSING NOTE
Patient identified using two patient identifiers.  Ear exam showing wax occlusion completed by provider.  Solution: warm water was placed in the bilateral ear(s) via irrigation tool: elephant ear.    Jael Lane CMA

## 2022-11-20 ENCOUNTER — HEALTH MAINTENANCE LETTER (OUTPATIENT)
Age: 82
End: 2022-11-20

## 2023-04-15 ENCOUNTER — HEALTH MAINTENANCE LETTER (OUTPATIENT)
Age: 83
End: 2023-04-15

## 2023-06-05 DIAGNOSIS — I10 BENIGN HYPERTENSION: Chronic | ICD-10-CM

## 2023-06-05 RX ORDER — AMLODIPINE BESYLATE 5 MG/1
TABLET ORAL
Qty: 90 TABLET | Refills: 3 | OUTPATIENT
Start: 2023-06-05

## 2023-06-08 NOTE — TELEPHONE ENCOUNTER
Pt called and stated she made an appt for 9/28/23. Pt is requesting a refill for amlodipine.     Routing to PCP to review and advise.

## 2023-06-09 RX ORDER — AMLODIPINE BESYLATE 5 MG/1
5 TABLET ORAL DAILY
Qty: 90 TABLET | Refills: 0 | Status: SHIPPED | OUTPATIENT
Start: 2023-06-09 | End: 2023-08-29

## 2023-08-28 DIAGNOSIS — I10 BENIGN HYPERTENSION: Chronic | ICD-10-CM

## 2023-08-29 RX ORDER — AMLODIPINE BESYLATE 5 MG/1
5 TABLET ORAL DAILY
Qty: 90 TABLET | Refills: 0 | Status: SHIPPED | OUTPATIENT
Start: 2023-08-29 | End: 2023-09-28

## 2023-08-29 RX ORDER — METOPROLOL TARTRATE 50 MG
TABLET ORAL
Qty: 180 TABLET | Refills: 0 | Status: SHIPPED | OUTPATIENT
Start: 2023-08-29 | End: 2023-09-28

## 2023-09-10 ENCOUNTER — HEALTH MAINTENANCE LETTER (OUTPATIENT)
Age: 83
End: 2023-09-10

## 2023-09-27 ASSESSMENT — ENCOUNTER SYMPTOMS
HEARTBURN: 0
PARESTHESIAS: 0
SORE THROAT: 0
DIARRHEA: 0
NERVOUS/ANXIOUS: 0
HEADACHES: 0
HEMATURIA: 0
WEAKNESS: 0
CONSTIPATION: 0
PALPITATIONS: 0
DYSURIA: 0
HEMATOCHEZIA: 0
ABDOMINAL PAIN: 0
CHILLS: 0
FEVER: 0
SHORTNESS OF BREATH: 0
NAUSEA: 0
JOINT SWELLING: 0
MYALGIAS: 0
DIZZINESS: 0
COUGH: 0
FREQUENCY: 0
ARTHRALGIAS: 0
BREAST MASS: 0
EYE PAIN: 0

## 2023-09-27 ASSESSMENT — ACTIVITIES OF DAILY LIVING (ADL): CURRENT_FUNCTION: NO ASSISTANCE NEEDED

## 2023-09-28 ENCOUNTER — OFFICE VISIT (OUTPATIENT)
Dept: FAMILY MEDICINE | Facility: CLINIC | Age: 83
End: 2023-09-28
Payer: COMMERCIAL

## 2023-09-28 VITALS
DIASTOLIC BLOOD PRESSURE: 75 MMHG | TEMPERATURE: 97.8 F | OXYGEN SATURATION: 96 % | HEIGHT: 63 IN | RESPIRATION RATE: 16 BRPM | HEART RATE: 72 BPM | SYSTOLIC BLOOD PRESSURE: 147 MMHG | BODY MASS INDEX: 33.84 KG/M2 | WEIGHT: 191 LBS

## 2023-09-28 DIAGNOSIS — Z12.31 VISIT FOR SCREENING MAMMOGRAM: ICD-10-CM

## 2023-09-28 DIAGNOSIS — Z76.0 ENCOUNTER FOR MEDICATION REFILL: ICD-10-CM

## 2023-09-28 DIAGNOSIS — I10 BENIGN HYPERTENSION: Chronic | ICD-10-CM

## 2023-09-28 DIAGNOSIS — Z00.00 ENCOUNTER FOR MEDICARE ANNUAL WELLNESS EXAM: Primary | ICD-10-CM

## 2023-09-28 PROCEDURE — G0439 PPPS, SUBSEQ VISIT: HCPCS | Performed by: INTERNAL MEDICINE

## 2023-09-28 RX ORDER — METOPROLOL TARTRATE 50 MG
50 TABLET ORAL 2 TIMES DAILY
Qty: 180 TABLET | Refills: 2 | Status: SHIPPED | OUTPATIENT
Start: 2023-09-28 | End: 2023-11-21

## 2023-09-28 RX ORDER — AMLODIPINE BESYLATE 5 MG/1
5 TABLET ORAL DAILY
Qty: 90 TABLET | Refills: 2 | Status: SHIPPED | OUTPATIENT
Start: 2023-09-28 | End: 2023-11-21

## 2023-09-28 ASSESSMENT — ACTIVITIES OF DAILY LIVING (ADL): CURRENT_FUNCTION: NO ASSISTANCE NEEDED

## 2023-09-28 ASSESSMENT — PAIN SCALES - GENERAL: PAINLEVEL: NO PAIN (0)

## 2023-09-28 NOTE — PATIENT INSTRUCTIONS
Patient Education   Personalized Prevention Plan  You are due for the preventive services outlined below.  Your care team is available to assist you in scheduling these services.  If you have already completed any of these items, please share that information with your care team to update in your medical record.  Health Maintenance Due   Topic Date Due     Osteoporosis Screening  01/14/2018     Mammogram  02/07/2019     COVID-19 Vaccine (6 - Pfizer series) 01/15/2023     Annual Wellness Visit  07/18/2023     ANNUAL REVIEW OF HM ORDERS  07/18/2023     Hearing Loss: Care Instructions  Overview     Hearing loss is a sudden or slow decrease in how well you hear. It can range from slight to profound. Permanent hearing loss can occur with aging. It also can happen when you are exposed long-term to loud noise. Examples include listening to loud music, riding motorcycles, or being around other loud machines.  Hearing loss can affect your work and home life. It can make you feel lonely or depressed. You may feel that you have lost your independence. But hearing aids and other devices can help you hear better and feel connected to others.  Follow-up care is a key part of your treatment and safety. Be sure to make and go to all appointments, and call your doctor if you are having problems. It's also a good idea to know your test results and keep a list of the medicines you take.  How can you care for yourself at home?  Avoid loud noises whenever possible. This helps keep your hearing from getting worse.  Always wear hearing protection around loud noises.  Wear a hearing aid as directed.  A professional can help you pick a hearing aid that will work best for you.  You can also get hearing aids over the counter for mild to moderate hearing loss.  Have hearing tests as your doctor suggests. They can show whether your hearing has changed. Your hearing aid may need to be adjusted.  Use other devices as needed. These may  "include:  Telephone amplifiers and hearing aids that can connect to a television, stereo, radio, or microphone.  Devices that use lights or vibrations. These alert you to the doorbell, a ringing telephone, or a baby monitor.  Television closed-captioning. This shows the words at the bottom of the screen. Most new TVs can do this.  TTY (text telephone). This lets you type messages back and forth on the telephone instead of talking or listening. These devices are also called TDD. When messages are typed on the keyboard, they are sent over the phone line to a receiving TTY. The message is shown on a monitor.  Use text messaging, social media, and email if it is hard for you to communicate by telephone.  Try to learn a listening technique called speechreading. It is not lipreading. You pay attention to people's gestures, expressions, posture, and tone of voice. These clues can help you understand what a person is saying. Face the person you are talking to, and have them face you. Make sure the lighting is good. You need to see the other person's face clearly.  Think about counseling if you need help to adjust to your hearing loss.  When should you call for help?  Watch closely for changes in your health, and be sure to contact your doctor if:    You think your hearing is getting worse.     You have new symptoms, such as dizziness or nausea.   Where can you learn more?  Go to https://www.[employer].net/patiented  Enter R798 in the search box to learn more about \"Hearing Loss: Care Instructions.\"  Current as of: March 1, 2023               Content Version: 13.7 2006-2023 [employer].   Care instructions adapted under license by your healthcare professional. If you have questions about a medical condition or this instruction, always ask your healthcare professional. [employer] disclaims any warranty or liability for your use of this information.      Bladder Training: Care Instructions  Your " Care Instructions     Bladder training is used to treat urge incontinence and stress incontinence. Urge incontinence means that the need to urinate comes on so fast that you can't get to a toilet in time. Stress incontinence means that you leak urine because of pressure on your bladder. For example, it may happen when you laugh, cough, or lift something heavy.  Bladder training can increase how long you can wait before you have to urinate. It can also help your bladder hold more urine. And it can give you better control over the urge to urinate.  It is important to remember that bladder training takes a few weeks to a few months to make a difference. You may not see results right away, but don't give up.  Follow-up care is a key part of your treatment and safety. Be sure to make and go to all appointments, and call your doctor if you are having problems. It's also a good idea to know your test results and keep a list of the medicines you take.  How can you care for yourself at home?  Work with your doctor to come up with a bladder training program that is right for you. You may use one or more of the following methods.  Delayed urination  In the beginning, try to keep from urinating for 5 minutes after you first feel the need to go.  While you wait, take deep, slow breaths to relax. Kegel exercises can also help you delay the need to go to the bathroom.  After some practice, when you can easily wait 5 minutes to urinate, try to wait 10 minutes before you urinate.  Slowly increase the waiting period until you are able to control when you have to urinate.  Scheduled urination  Empty your bladder when you first wake up in the morning.  Schedule times throughout the day when you will urinate.  Start by going to the bathroom every hour, even if you don't need to go.  Slowly increase the time between trips to the bathroom.  When you have found a schedule that works well for you, keep doing it.  If you wake up during the  "night and have to urinate, do it. Apply your schedule to waking hours only.  Kegel exercises  These tighten and strengthen pelvic muscles, which can help you control the flow of urine. (If doing these exercises causes pain, stop doing them and talk with your doctor.) To do Kegel exercises:  Squeeze your muscles as if you were trying not to pass gas. Or squeeze your muscles as if you were stopping the flow of urine. Your belly, legs, and buttocks shouldn't move.  Hold the squeeze for 3 seconds, then relax for 5 to 10 seconds.  Start with 3 seconds, then add 1 second each week until you are able to squeeze for 10 seconds.  Repeat the exercise 10 times a session. Do 3 to 8 sessions a day.  When should you call for help?  Watch closely for changes in your health, and be sure to contact your doctor if:    Your incontinence is getting worse.     You do not get better as expected.   Where can you learn more?  Go to https://www.[employer].net/patiented  Enter V684 in the search box to learn more about \"Bladder Training: Care Instructions.\"  Current as of: March 1, 2023               Content Version: 13.7    4922-2071 [employer].   Care instructions adapted under license by your healthcare professional. If you have questions about a medical condition or this instruction, always ask your healthcare professional. [employer] disclaims any warranty or liability for your use of this information.         "

## 2023-09-28 NOTE — PROGRESS NOTES
"SUBJECTIVE:   Rosa is a 82 year old who presents for Preventive Visit.      Are you in the first 12 months of your Medicare coverage?  No    Healthy Habits:     In general, how would you rate your overall health?  Good    Frequency of exercise:  2-3 days/week    Duration of exercise:  30-45 minutes    Do you usually eat at least 4 servings of fruit and vegetables a day, include whole grains    & fiber and avoid regularly eating high fat or \"junk\" foods?  Yes    Taking medications regularly:  Yes    Ability to successfully perform activities of daily living:  No assistance needed    Home Safety:  No safety concerns identified    Hearing Impairment:  Difficulty following a conversation in a noisy restaurant or crowded room, feel that people are mumbling or not speaking clearly, difficult to understand a speaker at a public meeting or Buddhism service, need to ask people to speak up or repeat themselves and difficulty understanding soft or whispered speech    In the past 6 months, have you been bothered by leaking of urine? Yes    In general, how would you rate your overall mental or emotional health?  Good    Additional concerns today:  No          Have you ever done Advance Care Planning? (For example, a Health Directive, POLST, or a discussion with a medical provider or your loved ones about your wishes): Yes, patient states has an Advance Care Planning document and will bring a copy to the clinic.       Fall risk  Fallen 2 or more times in the past year?: No  Any fall with injury in the past year?: No    Cognitive Screening   1) Repeat 3 items (Leader, Season, Table)    2) Clock draw: NORMAL  3) 3 item recall: Recalls 3 objects  Results: 3 items recalled: COGNITIVE IMPAIRMENT LESS LIKELY    Mini-CogTM Copyright WESLEY Moyer. Licensed by the author for use in [facility]; reprinted with permission (nu@.[email or web address]). All rights reserved.      Do you have sleep apnea, excessive snoring or daytime drowsiness? " : no    Reviewed and updated as needed this visit by clinical staff   Tobacco  Allergies  Meds              Reviewed and updated as needed this visit by Provider                 Social History     Tobacco Use     Smoking status: Never     Smokeless tobacco: Never   Substance Use Topics     Alcohol use: Yes     Alcohol/week: 0.0 standard drinks of alcohol     Comment: occasional glass of wine  q 2 weeks - 1 month              9/27/2023    10:54 AM   Alcohol Use   Prescreen: >3 drinks/day or >7 drinks/week? No     Do you have a current opioid prescription? No  Do you use any other controlled substances or medications that are not prescribed by a provider? None      Current providers sharing in care for this patient include: Patient Care Team:  Juliette Champagne MD as PCP - General (Internal Medicine)  Ciara Christie as   Juliette Champagne MD as Assigned PCP    The following health maintenance items are reviewed in Epic and correct as of today:  Health Maintenance   Topic Date Due     DEXA  01/14/2018     MAMMO SCREENING  02/07/2019     COVID-19 Vaccine (6 - Pfizer series) 01/15/2023     MEDICARE ANNUAL WELLNESS VISIT  07/18/2023     ANNUAL REVIEW OF HM ORDERS  07/18/2023     FALL RISK ASSESSMENT  09/28/2024     LIPID  06/24/2026     ADVANCE CARE PLANNING  07/19/2027     DTAP/TDAP/TD IMMUNIZATION (2 - Td or Tdap) 07/18/2032     PHQ-2 (once per calendar year)  Completed     INFLUENZA VACCINE  Completed     Pneumococcal Vaccine: 65+ Years  Completed     ZOSTER IMMUNIZATION  Completed     IPV IMMUNIZATION  Aged Out     HPV IMMUNIZATION  Aged Out     MENINGITIS IMMUNIZATION  Aged Out     COLORECTAL CANCER SCREENING  Discontinued     Labs reviewed in EPIC    Pertinent mammograms are reviewed under the imaging tab.      Review of Systems  Constitutional, HEENT, cardiovascular, pulmonary, GI, , musculoskeletal, neuro, skin, endocrine and psych systems are negative, except as otherwise noted.    OBJECTIVE:  "  BP (!) 147/75 (BP Location: Right arm, Patient Position: Sitting, Cuff Size: Adult Regular)   Pulse 72   Temp 97.8  F (36.6  C) (Oral)   Resp 16   Ht 1.6 m (5' 3\")   Wt 86.6 kg (191 lb)   LMP  (LMP Unknown)   SpO2 96%   Breastfeeding No   BMI 33.83 kg/m   Estimated body mass index is 33.83 kg/m  as calculated from the following:    Height as of this encounter: 1.6 m (5' 3\").    Weight as of this encounter: 86.6 kg (191 lb).  Physical Exam  GENERAL: alert and no distress  EYES: Eyes grossly normal to inspection, conjunctivae and sclerae normal  HENT: ear canals and TM's normal, nose and mouth without ulcers or lesions  NECK: no asymmetry  RESP: lungs clear to auscultation   CV: regular rate and rhythm  ABDOMEN: soft, nontender, bowel sounds normal  MS: no gross musculoskeletal defects noted, no edema  SKIN: no suspicious lesions or rashes  NEURO: Normal strength and tone, mentation intact and speech normal  PSYCH: mentation appears normal, affect normal/bright    Diagnostic Test Results:  Labs reviewed in Epic    ASSESSMENT / PLAN:   Rosa was seen today for physical.    Diagnoses and all orders for this visit:    Encounter for Medicare annual wellness exam  -     REVIEW OF HEALTH MAINTENANCE PROTOCOL ORDERS    Visit for screening mammogram  -     *MA Screening Digital Bilateral; Future    Encounter for medication refill    Benign hypertension; goal < 150/90  -     metoprolol tartrate (LOPRESSOR) 50 MG tablet; Take 1 tablet (50 mg) by mouth 2 times daily  -     amLODIPine (NORVASC) 5 MG tablet; Take 1 tablet (5 mg) by mouth daily    Other orders  -     PRIMARY CARE FOLLOW-UP SCHEDULING; Future        Patient has been advised of split billing requirements and indicates understanding: Yes      COUNSELING:  Reviewed preventive health counseling, as reflected in patient instructions  Special attention given to:       Regular exercise       Healthy diet/nutrition        She reports that she has never smoked. " She has never used smokeless tobacco.      Appropriate preventive services were discussed with this patient, including applicable screening as appropriate for fall prevention, nutrition, physical activity, Tobacco-use cessation, weight loss and cognition.  Checklist reviewing preventive services available has been given to the patient.    Reviewed patients plan of care and provided an AVS. The Basic Care Plan (routine screening as documented in Health Maintenance) for Rosa meets the Care Plan requirement. This Care Plan has been established and reviewed with the Patient.          Juliette Champagne MD  [facility]    Identified Health Risks:  I have reviewed Opioid Use Disorder and Substance Use Disorder risk factors and made any needed referrals.

## 2023-11-21 ENCOUNTER — MYC REFILL (OUTPATIENT)
Dept: FAMILY MEDICINE | Facility: CLINIC | Age: 83
End: 2023-11-21
Payer: COMMERCIAL

## 2023-11-21 DIAGNOSIS — I10 BENIGN HYPERTENSION: Chronic | ICD-10-CM

## 2023-11-21 RX ORDER — METOPROLOL TARTRATE 50 MG
50 TABLET ORAL 2 TIMES DAILY
Qty: 180 TABLET | Refills: 2 | Status: SHIPPED | OUTPATIENT
Start: 2023-11-21 | End: 2024-08-26

## 2023-11-21 RX ORDER — AMLODIPINE BESYLATE 5 MG/1
5 TABLET ORAL DAILY
Qty: 90 TABLET | Refills: 2 | Status: SHIPPED | OUTPATIENT
Start: 2023-11-21 | End: 2024-08-26

## 2023-11-24 ENCOUNTER — TELEPHONE (OUTPATIENT)
Dept: FAMILY MEDICINE | Facility: CLINIC | Age: 83
End: 2023-11-24
Payer: COMMERCIAL

## 2023-11-24 NOTE — TELEPHONE ENCOUNTER
Pt called requesting Medication refills. She keeps getting a recording from [facility] pharmacy that they are waiting response from PCP. Triage informed pt PCP approved Rx's 11/21 with refills. Advised her to talk to pharmacist to have them get Rx's ready for her. If pt runs to any problems, she can call triage back. Pt verbalized agreement with plan.

## 2024-06-16 ENCOUNTER — HEALTH MAINTENANCE LETTER (OUTPATIENT)
Age: 84
End: 2024-06-16

## 2024-08-17 DIAGNOSIS — I10 BENIGN HYPERTENSION: Chronic | ICD-10-CM

## 2024-08-22 NOTE — TELEPHONE ENCOUNTER
Patient called requesting update on these refills, states she will be out of the medication soon. Patient states she has an appt with PCP in October.

## 2024-08-26 RX ORDER — METOPROLOL TARTRATE 50 MG
50 TABLET ORAL 2 TIMES DAILY
Qty: 180 TABLET | Refills: 0 | Status: SHIPPED | OUTPATIENT
Start: 2024-08-26

## 2024-08-26 RX ORDER — AMLODIPINE BESYLATE 5 MG/1
5 TABLET ORAL DAILY
Qty: 90 TABLET | Refills: 0 | Status: SHIPPED | OUTPATIENT
Start: 2024-08-26 | End: 2024-10-04

## 2024-09-29 SDOH — HEALTH STABILITY: PHYSICAL HEALTH: ON AVERAGE, HOW MANY DAYS PER WEEK DO YOU ENGAGE IN MODERATE TO STRENUOUS EXERCISE (LIKE A BRISK WALK)?: 0 DAYS

## 2024-09-29 SDOH — HEALTH STABILITY: PHYSICAL HEALTH: ON AVERAGE, HOW MANY MINUTES DO YOU ENGAGE IN EXERCISE AT THIS LEVEL?: 0 MIN

## 2024-09-29 ASSESSMENT — SOCIAL DETERMINANTS OF HEALTH (SDOH): HOW OFTEN DO YOU GET TOGETHER WITH FRIENDS OR RELATIVES?: PATIENT DECLINED

## 2024-10-04 ENCOUNTER — OFFICE VISIT (OUTPATIENT)
Dept: FAMILY MEDICINE | Facility: CLINIC | Age: 84
End: 2024-10-04
Payer: COMMERCIAL

## 2024-10-04 VITALS
RESPIRATION RATE: 16 BRPM | HEART RATE: 77 BPM | BODY MASS INDEX: 33.49 KG/M2 | DIASTOLIC BLOOD PRESSURE: 70 MMHG | OXYGEN SATURATION: 98 % | WEIGHT: 189 LBS | SYSTOLIC BLOOD PRESSURE: 128 MMHG | HEIGHT: 63 IN | TEMPERATURE: 97.7 F

## 2024-10-04 DIAGNOSIS — I10 BENIGN HYPERTENSION: Chronic | ICD-10-CM

## 2024-10-04 DIAGNOSIS — H61.23 BILATERAL IMPACTED CERUMEN: ICD-10-CM

## 2024-10-04 DIAGNOSIS — Z00.00 ENCOUNTER FOR SUBSEQUENT ANNUAL WELLNESS VISIT IN MEDICARE PATIENT: Primary | ICD-10-CM

## 2024-10-04 PROCEDURE — G0439 PPPS, SUBSEQ VISIT: HCPCS | Mod: 25 | Performed by: INTERNAL MEDICINE

## 2024-10-04 PROCEDURE — 69209 REMOVE IMPACTED EAR WAX UNI: CPT | Mod: 50 | Performed by: INTERNAL MEDICINE

## 2024-10-04 RX ORDER — AMLODIPINE BESYLATE 5 MG/1
5 TABLET ORAL DAILY
Qty: 90 TABLET | Refills: 3 | Status: SHIPPED | OUTPATIENT
Start: 2024-10-04

## 2024-10-04 ASSESSMENT — PAIN SCALES - GENERAL: PAINLEVEL: NO PAIN (0)

## 2024-10-04 NOTE — PROGRESS NOTES
"Preventive Care Visit  [facility]  Juliette Champagne MD, Internal Medicine  Oct 4, 2024      Assessment & Plan     Benign hypertension; goal < 150/90  - REVIEW OF HEALTH MAINTENANCE PROTOCOL ORDERS  - amLODIPine (NORVASC) 5 MG tablet  Dispense: 90 tablet; Refill: 3    Encounter for subsequent annual wellness visit in Medicare patient  - diet, exercise    Bilateral impacted cerumen  - bilateral ears cerumen impaction symptoms  - patient's TM's are clear, visible in both ears after REMOVE IMPACTED CERUMEN by lavage in clinic today      Patient has been advised of split billing requirements and indicates understanding: Yes        BMI  Estimated body mass index is 33.48 kg/m  as calculated from the following:    Height as of this encounter: 1.6 m (5' 3\").    Weight as of this encounter: 85.7 kg (189 lb).   Weight management plan: Discussed healthy diet and exercise guidelines    Counseling  Appropriate preventive services were addressed with this patient via screening, questionnaire, or discussion as appropriate for fall prevention, nutrition, physical activity, Tobacco-use cessation, social engagement, weight loss and cognition.  Checklist reviewing preventive services available has been given to the patient.  Reviewed patient's diet, addressing concerns and/or questions.   The patient was provided with written information regarding signs of hearing loss.   Information on urinary incontinence and treatment options given to patient.       FUTURE APPOINTMENTS:       - Follow-up visit in 1 year    Sommer Lee is a 83 year old, presenting for the following:        Health Care Directive  Patient has a Health Care Directive on file  Advance care planning document is on file and is current.    HPI        9/29/2024   General Health   How would you rate your overall physical health? Good   Feel stress (tense, anxious, or unable to sleep) Not at all            9/29/2024   Nutrition   Diet: Regular (no " restrictions)            9/29/2024   Exercise   Days per week of moderate/strenous exercise 0 days   Average minutes spent exercising at this level 0 min      (!) EXERCISE CONCERN      9/29/2024   Social Factors   Frequency of gathering with friends or relatives Patient declined   Worry food won't last until get money to buy more No   Food not last or not have enough money for food? No   Do you have housing? (Housing is defined as stable permanent housing and does not include staying ouside in a car, in a tent, in an abandoned building, in an overnight shelter, or couch-surfing.) Yes   Are you worried about losing your housing? No   Lack of transportation? No   Unable to get utilities (heat,electricity)? No            9/29/2024   Fall Risk   Fallen 2 or more times in the past year? No    No   Trouble with walking or balance? No    No       Multiple values from one day are sorted in reverse-chronological order          9/29/2024   Activities of Daily Living- Home Safety   Needs help with the following daily activites None of the above   Safety concerns in the home None of the above            9/29/2024   Dental   Dentist two times every year? Yes            9/29/2024   Hearing Screening   Hearing concerns? (!) I NEED TO ASK PEOPLE TO SPEAK UP OR REPEAT THEMSELVES.    (!) TROUBLE UNDERSTANDING SOFT OR WHISPERED SPEECH.       Multiple values from one day are sorted in reverse-chronological order         9/29/2024   Driving Risk Screening   Patient/family members have concerns about driving No            9/29/2024   General Alertness/Fatigue Screening   Have you been more tired than usual lately? No            9/29/2024   Urinary Incontinence Screening   Bothered by leaking urine in past 6 months Yes            9/29/2024   TB Screening   Were you born outside of the [place]? No            Today's PHQ-2 Score:       10/4/2024    12:26 PM   PHQ-2 ( 1999 [employer])   Q1: Little interest or pleasure in doing things 1   Q2: Feeling  down, depressed or hopeless 0   PHQ-2 Score 1   Q1: Little interest or pleasure in doing things Several days   Q2: Feeling down, depressed or hopeless Not at all   PHQ-2 Score 1           9/29/2024   Substance Use   Alcohol more than 3/day or more than 7/wk No   Do you have a current opioid prescription? No   How severe/bad is pain from 1 to 10? 0/10 (No Pain)   Do you use any other substances recreationally? No        Social History     Tobacco Use    Smoking status: Never    Smokeless tobacco: Never   Substance Use Topics    Alcohol use: Yes     Alcohol/week: 0.0 standard drinks of alcohol     Comment: occasional glass of wine  q 2 weeks - 1 month     Drug use: No       Mammogram Screening - After age 74- determine frequency with patient based on health status, life expectancy and patient goals          Reviewed and updated as needed this visit by Provider                    Past Medical History:   Diagnosis Date    Allergic rhinitis, cause unspecified     eye issues - only a spring allergy - have been on rx for meds - allergra - march to may, patanol in the past     ASYMPTOMATIC VARICOSE VEINS spider type R ankle/ft 2/28/2006    Disorder of bone and cartilage, unspecified 2003     osteopenia of the femoral neck    Fam hx-cardiovas dis NEC     mother and father and brother - CAD     FEMALE GENITAL SYMPTOMS protruding vaginal tissue  2/28/2006    HTN (hypertension)     Hyperlipidemia LDL goal <160     Obesity, unspecified     long standing problem - no signficant trials of weight loss in the past     Personal history of colonic polyps 5/2003    10 years - benign polyp only     Positive FIT (fecal immunochemical test) Jan 2015    Tubular adenoma of colon following result --> f/u colonoscopy 2020    ROUTINE GYN EXAMINATION 4/14/2007    3/06 [facility] - Dr Jones - pap smear negative    Unspecified cataract 2006    right eye - light and darkness only now - eye doctor once a year - Waldemar Good     "Unspecified retinal detachment 1985    right eye - retinal detachment and cataracts     Unspecified visual loss 1985    right eye - light and dark only, membrane detached after - retinal specialist - nothing else surgically to fix it     URINARY SYS SYMPTOM NEC - urine leakage  2/28/2006    pad liner stable     Current providers sharing in care for this patient include:  Patient Care Team:  Juliette Champagne MD as PCP - General (Internal Medicine)  Ciara Christie as   Juliette Champagne MD as Assigned PCP    The following health maintenance items are reviewed in Epic and correct as of today:  Health Maintenance   Topic Date Due    RSV VACCINE (1 - 1-dose 75+ series) Never done    DEXA  01/14/2018    MAMMO SCREENING  02/07/2019    BMP  06/24/2022    LIPID  06/24/2022    INFLUENZA VACCINE (1) 09/01/2024    COVID-19 Vaccine (8 - 2024-25 season) 09/01/2024    ANNUAL REVIEW OF HM ORDERS  09/28/2024    MEDICARE ANNUAL WELLNESS VISIT  09/28/2024    FALL RISK ASSESSMENT  10/04/2025    ADVANCE CARE PLANNING  09/28/2028    DTAP/TDAP/TD IMMUNIZATION (2 - Td or Tdap) 07/18/2032    PHQ-2 (once per calendar year)  Completed    Pneumococcal Vaccine: 65+ Years  Completed    ZOSTER IMMUNIZATION  Completed    HPV IMMUNIZATION  Aged Out    MENINGITIS IMMUNIZATION  Aged Out    RSV MONOCLONAL ANTIBODY  Aged Out    COLORECTAL CANCER SCREENING  Discontinued         Review of Systems  Constitutional, HEENT, cardiovascular, pulmonary, GI, , musculoskeletal, neuro, skin, endocrine and psych systems are negative, except as otherwise noted.     Objective    Exam  /70 (BP Location: Left arm, Patient Position: Sitting, Cuff Size: Adult Large)   Pulse 77   Temp 97.7  F (36.5  C) (Temporal)   Resp 16   Ht 1.6 m (5' 3\")   Wt 85.7 kg (189 lb)   LMP  (LMP Unknown)   SpO2 98%   BMI 33.48 kg/m     Estimated body mass index is 33.83 kg/m  as calculated from the following:    Height as of 9/28/23: 1.6 m (5' 3\").    Weight " as of 9/28/23: 86.6 kg (191 lb).    Physical Exam  GENERAL: alert and no distress  EYES: Eyes grossly normal to inspection, conjunctivae and sclerae normal  HENT: bilateral ears cerumen impaction symptoms noted  NECK: no adenopathy, no asymmetry, masses, or scars  RESP: lungs clear to auscultation - no rales, rhonchi or wheezes  CV: regular rate and rhythm, normal S1 S2  ABDOMEN: soft, nontender, bowel sounds normal  MS: no gross musculoskeletal defects noted, no edema  SKIN: no suspicious lesions or rashes  NEURO: Normal strength and tone, mentation intact and speech normal  PSYCH: mentation appears normal, affect normal/bright        10/4/2024   Mini Cog   Clock Draw Score 2 Normal   3 Item Recall 3 objects recalled   Mini Cog Total Score 5                 Signed Electronically by: Juliette Champagne MD

## 2024-10-14 NOTE — PROGRESS NOTES
Patient identified using two patient identifiers.  Ear exam showing wax occlusion completed by provider.  Solution: warm water was placed in the bilateral ear(s) via irrigation tool: elephant ear.  Rasta Mcgovern CMA on 10/14/2024 at 9:09 AM

## 2024-11-07 DIAGNOSIS — I10 BENIGN HYPERTENSION: Chronic | ICD-10-CM

## 2024-11-07 RX ORDER — METOPROLOL TARTRATE 50 MG
50 TABLET ORAL 2 TIMES DAILY
Qty: 180 TABLET | Refills: 0 | Status: SHIPPED | OUTPATIENT
Start: 2024-11-07

## 2025-01-01 ENCOUNTER — APPOINTMENT (OUTPATIENT)
Dept: GENERAL RADIOLOGY | Facility: CLINIC | Age: 85
End: 2025-01-01
Attending: EMERGENCY MEDICINE
Payer: COMMERCIAL

## 2025-01-01 ENCOUNTER — APPOINTMENT (OUTPATIENT)
Dept: CARDIOLOGY | Facility: CLINIC | Age: 85
DRG: 871 | End: 2025-01-01
Attending: EMERGENCY MEDICINE
Payer: COMMERCIAL

## 2025-01-01 ENCOUNTER — HOSPITAL ENCOUNTER (INPATIENT)
Facility: CLINIC | Age: 85
LOS: 1 days | End: 2025-01-15
Attending: EMERGENCY MEDICINE
Payer: COMMERCIAL

## 2025-01-01 VITALS
SYSTOLIC BLOOD PRESSURE: 103 MMHG | TEMPERATURE: 99.1 F | DIASTOLIC BLOOD PRESSURE: 57 MMHG | BODY MASS INDEX: 33.94 KG/M2 | WEIGHT: 191.58 LBS | OXYGEN SATURATION: 73 %

## 2025-01-01 DIAGNOSIS — I50.9 ACUTE CONGESTIVE HEART FAILURE, UNSPECIFIED HEART FAILURE TYPE (H): ICD-10-CM

## 2025-01-01 DIAGNOSIS — I21.4 NSTEMI (NON-ST ELEVATED MYOCARDIAL INFARCTION) (H): ICD-10-CM

## 2025-01-01 DIAGNOSIS — R65.21 SEPTIC SHOCK (H): ICD-10-CM

## 2025-01-01 DIAGNOSIS — A41.9 SEPTIC SHOCK (H): ICD-10-CM

## 2025-01-01 DIAGNOSIS — N17.9 AKI (ACUTE KIDNEY INJURY): ICD-10-CM

## 2025-01-01 DIAGNOSIS — J96.01 ACUTE RESPIRATORY FAILURE WITH HYPOXIA (H): ICD-10-CM

## 2025-01-01 LAB
ALBUMIN SERPL BCG-MCNC: 3 G/DL (ref 3.5–5.2)
ALBUMIN SERPL BCG-MCNC: 3.5 G/DL (ref 3.5–5.2)
ALLEN'S TEST: ABNORMAL
ALLEN'S TEST: ABNORMAL
ALP SERPL-CCNC: 82 U/L (ref 40–150)
ALP SERPL-CCNC: 87 U/L (ref 40–150)
ALT SERPL W P-5'-P-CCNC: 28 U/L (ref 0–50)
ALT SERPL W P-5'-P-CCNC: 51 U/L (ref 0–50)
ANION GAP SERPL CALCULATED.3IONS-SCNC: 18 MMOL/L (ref 7–15)
ANION GAP SERPL CALCULATED.3IONS-SCNC: 20 MMOL/L (ref 7–15)
AST SERPL W P-5'-P-CCNC: 196 U/L (ref 0–45)
AST SERPL W P-5'-P-CCNC: 240 U/L (ref 0–45)
ATRIAL RATE - MUSE: 132 BPM
ATRIAL RATE - MUSE: 137 BPM
ATRIAL RATE - MUSE: 140 BPM
BASE EXCESS BLDA CALC-SCNC: -15.1 MMOL/L (ref -3–3)
BASE EXCESS BLDA CALC-SCNC: -17 MMOL/L (ref -3–3)
BASE EXCESS BLDV CALC-SCNC: -14 MMOL/L (ref -3–3)
BASE EXCESS BLDV CALC-SCNC: -6 MMOL/L (ref -3–3)
BASE EXCESS BLDV CALC-SCNC: -8 MMOL/L (ref -3–3)
BASOPHILS # BLD AUTO: 0 10E3/UL (ref 0–0.2)
BASOPHILS # BLD AUTO: 0.1 10E3/UL (ref 0–0.2)
BASOPHILS NFR BLD AUTO: 0 %
BASOPHILS NFR BLD AUTO: 0 %
BILIRUB SERPL-MCNC: 0.5 MG/DL
BILIRUB SERPL-MCNC: 0.8 MG/DL
BUN SERPL-MCNC: 27.1 MG/DL (ref 8–23)
BUN SERPL-MCNC: 28.7 MG/DL (ref 8–23)
CA-I BLD-MCNC: 4.4 MG/DL (ref 4.4–5.2)
CALCIUM SERPL-MCNC: 7.9 MG/DL (ref 8.8–10.4)
CALCIUM SERPL-MCNC: 9 MG/DL (ref 8.8–10.4)
CHLORIDE SERPL-SCNC: 101 MMOL/L (ref 98–107)
CHLORIDE SERPL-SCNC: 97 MMOL/L (ref 98–107)
CREAT SERPL-MCNC: 1.54 MG/DL (ref 0.51–0.95)
CREAT SERPL-MCNC: 1.93 MG/DL (ref 0.51–0.95)
DIASTOLIC BLOOD PRESSURE - MUSE: NORMAL MMHG
EGFRCR SERPLBLD CKD-EPI 2021: 25 ML/MIN/1.73M2
EGFRCR SERPLBLD CKD-EPI 2021: 33 ML/MIN/1.73M2
EOSINOPHIL # BLD AUTO: 0 10E3/UL (ref 0–0.7)
EOSINOPHIL # BLD AUTO: 0 10E3/UL (ref 0–0.7)
EOSINOPHIL NFR BLD AUTO: 0 %
EOSINOPHIL NFR BLD AUTO: 0 %
ERYTHROCYTE [DISTWIDTH] IN BLOOD BY AUTOMATED COUNT: 14.3 % (ref 10–15)
ERYTHROCYTE [DISTWIDTH] IN BLOOD BY AUTOMATED COUNT: 14.6 % (ref 10–15)
FLUAV RNA SPEC QL NAA+PROBE: NEGATIVE
FLUBV RNA RESP QL NAA+PROBE: NEGATIVE
GLUCOSE BLDC GLUCOMTR-MCNC: 238 MG/DL (ref 70–99)
GLUCOSE BLDC GLUCOMTR-MCNC: 262 MG/DL (ref 70–99)
GLUCOSE BLDC GLUCOMTR-MCNC: 275 MG/DL (ref 70–99)
GLUCOSE BLDC GLUCOMTR-MCNC: 293 MG/DL (ref 70–99)
GLUCOSE SERPL-MCNC: 208 MG/DL (ref 70–99)
GLUCOSE SERPL-MCNC: 296 MG/DL (ref 70–99)
HCO3 BLD-SCNC: 12 MMOL/L (ref 21–28)
HCO3 BLD-SCNC: 13 MMOL/L (ref 21–28)
HCO3 BLDV-SCNC: 17 MMOL/L (ref 21–28)
HCO3 BLDV-SCNC: 19 MMOL/L (ref 21–28)
HCO3 BLDV-SCNC: 22 MMOL/L (ref 21–28)
HCO3 SERPL-SCNC: 12 MMOL/L (ref 22–29)
HCO3 SERPL-SCNC: 18 MMOL/L (ref 22–29)
HCT VFR BLD AUTO: 37.9 % (ref 35–47)
HCT VFR BLD AUTO: 38.6 % (ref 35–47)
HGB BLD-MCNC: 11.6 G/DL (ref 11.7–15.7)
HGB BLD-MCNC: 12.3 G/DL (ref 11.7–15.7)
IMM GRANULOCYTES # BLD: 0.2 10E3/UL
IMM GRANULOCYTES # BLD: 0.3 10E3/UL
IMM GRANULOCYTES NFR BLD: 1 %
IMM GRANULOCYTES NFR BLD: 1 %
INTERPRETATION ECG - MUSE: NORMAL
LACTATE BLD-SCNC: 5.3 MMOL/L
LACTATE BLD-SCNC: 5.6 MMOL/L
LACTATE BLD-SCNC: 7 MMOL/L
LACTATE SERPL-SCNC: 12.7 MMOL/L (ref 0.7–2)
LACTATE SERPL-SCNC: 8.2 MMOL/L (ref 0.7–2)
LVEF ECHO: NORMAL
LYMPHOCYTES # BLD AUTO: 1.4 10E3/UL (ref 0.8–5.3)
LYMPHOCYTES # BLD AUTO: 1.6 10E3/UL (ref 0.8–5.3)
LYMPHOCYTES NFR BLD AUTO: 6 %
LYMPHOCYTES NFR BLD AUTO: 6 %
MAGNESIUM SERPL-MCNC: 2 MG/DL (ref 1.7–2.3)
MCH RBC QN AUTO: 29.7 PG (ref 26.5–33)
MCH RBC QN AUTO: 30 PG (ref 26.5–33)
MCHC RBC AUTO-ENTMCNC: 30.6 G/DL (ref 31.5–36.5)
MCHC RBC AUTO-ENTMCNC: 31.9 G/DL (ref 31.5–36.5)
MCV RBC AUTO: 94 FL (ref 78–100)
MCV RBC AUTO: 97 FL (ref 78–100)
MONOCYTES # BLD AUTO: 1 10E3/UL (ref 0–1.3)
MONOCYTES # BLD AUTO: 1.2 10E3/UL (ref 0–1.3)
MONOCYTES NFR BLD AUTO: 4 %
MONOCYTES NFR BLD AUTO: 5 %
NEUTROPHILS # BLD AUTO: 19.9 10E3/UL (ref 1.6–8.3)
NEUTROPHILS # BLD AUTO: 21.7 10E3/UL (ref 1.6–8.3)
NEUTROPHILS NFR BLD AUTO: 88 %
NEUTROPHILS NFR BLD AUTO: 89 %
NRBC # BLD AUTO: 0 10E3/UL
NRBC # BLD AUTO: 0 10E3/UL
NRBC BLD AUTO-RTO: 0 /100
NRBC BLD AUTO-RTO: 0 /100
NT-PROBNP SERPL-MCNC: ABNORMAL PG/ML (ref 0–1800)
O2/TOTAL GAS SETTING VFR VENT: 80 %
O2/TOTAL GAS SETTING VFR VENT: 80 %
OXYHGB MFR BLDA: 89 % (ref 92–100)
OXYHGB MFR BLDA: 92 % (ref 92–100)
P AXIS - MUSE: 18 DEGREES
P AXIS - MUSE: 56 DEGREES
P AXIS - MUSE: 60 DEGREES
PCO2 BLD: 39 MM HG (ref 35–45)
PCO2 BLD: 41 MM HG (ref 35–45)
PCO2 BLDV: 44 MM HG (ref 40–50)
PCO2 BLDV: 51 MM HG (ref 40–50)
PCO2 BLDV: 60 MM HG (ref 40–50)
PEEP: 12 CM H2O
PEEP: 12 CM H2O
PH BLD: 7.09 [PH] (ref 7.35–7.45)
PH BLD: 7.14 [PH] (ref 7.35–7.45)
PH BLDV: 7.06 [PH] (ref 7.32–7.43)
PH BLDV: 7.24 [PH] (ref 7.32–7.43)
PH BLDV: 7.24 [PH] (ref 7.32–7.43)
PHOSPHATE SERPL-MCNC: 6.8 MG/DL (ref 2.5–4.5)
PLATELET # BLD AUTO: 297 10E3/UL (ref 150–450)
PLATELET # BLD AUTO: 305 10E3/UL (ref 150–450)
PO2 BLD: 79 MM HG (ref 80–105)
PO2 BLD: 85 MM HG (ref 80–105)
PO2 BLDV: 21 MM HG (ref 25–47)
PO2 BLDV: 23 MM HG (ref 25–47)
PO2 BLDV: 38 MM HG (ref 25–47)
POTASSIUM SERPL-SCNC: 5.2 MMOL/L (ref 3.4–5.3)
POTASSIUM SERPL-SCNC: 5.7 MMOL/L (ref 3.4–5.3)
PR INTERVAL - MUSE: 138 MS
PR INTERVAL - MUSE: 152 MS
PR INTERVAL - MUSE: 158 MS
PROT SERPL-MCNC: 7.5 G/DL (ref 6.4–8.3)
PROT SERPL-MCNC: 8.6 G/DL (ref 6.4–8.3)
QRS DURATION - MUSE: 102 MS
QRS DURATION - MUSE: 104 MS
QRS DURATION - MUSE: 92 MS
QT - MUSE: 260 MS
QT - MUSE: 284 MS
QT - MUSE: 292 MS
QTC - MUSE: 392 MS
QTC - MUSE: 420 MS
QTC - MUSE: 445 MS
R AXIS - MUSE: 101 DEGREES
R AXIS - MUSE: 114 DEGREES
R AXIS - MUSE: 123 DEGREES
RBC # BLD AUTO: 3.9 10E6/UL (ref 3.8–5.2)
RBC # BLD AUTO: 4.1 10E6/UL (ref 3.8–5.2)
RSV RNA SPEC NAA+PROBE: NEGATIVE
SAO2 % BLDA: 90.1 % (ref 95–96)
SAO2 % BLDA: 93.5 % (ref 95–96)
SAO2 % BLDV: 27 % (ref 70–75)
SAO2 % BLDV: 31 % (ref 70–75)
SAO2 % BLDV: 50 % (ref 70–75)
SARS-COV-2 RNA RESP QL NAA+PROBE: NEGATIVE
SODIUM SERPL-SCNC: 133 MMOL/L (ref 135–145)
SODIUM SERPL-SCNC: 133 MMOL/L (ref 135–145)
SYSTOLIC BLOOD PRESSURE - MUSE: NORMAL MMHG
T AXIS - MUSE: 100 DEGREES
T AXIS - MUSE: 103 DEGREES
T AXIS - MUSE: 140 DEGREES
TROPONIN T SERPL HS-MCNC: 1788 NG/L
TROPONIN T SERPL HS-MCNC: 1854 NG/L
TROPONIN T SERPL HS-MCNC: 2656 NG/L
TROPONIN T SERPL HS-MCNC: 2719 NG/L
VENTRICULAR RATE- MUSE: 132 BPM
VENTRICULAR RATE- MUSE: 137 BPM
VENTRICULAR RATE- MUSE: 140 BPM
WBC # BLD AUTO: 22.5 10E3/UL (ref 4–11)
WBC # BLD AUTO: 24.7 10E3/UL (ref 4–11)

## 2025-01-01 PROCEDURE — 36556 INSERT NON-TUNNEL CV CATH: CPT

## 2025-01-01 PROCEDURE — 83605 ASSAY OF LACTIC ACID: CPT | Performed by: STUDENT IN AN ORGANIZED HEALTH CARE EDUCATION/TRAINING PROGRAM

## 2025-01-01 PROCEDURE — 76937 US GUIDE VASCULAR ACCESS: CPT

## 2025-01-01 PROCEDURE — 85025 COMPLETE CBC W/AUTO DIFF WBC: CPT | Performed by: EMERGENCY MEDICINE

## 2025-01-01 PROCEDURE — 258N000003 HC RX IP 258 OP 636: Performed by: EMERGENCY MEDICINE

## 2025-01-01 PROCEDURE — 84484 ASSAY OF TROPONIN QUANT: CPT | Performed by: STUDENT IN AN ORGANIZED HEALTH CARE EDUCATION/TRAINING PROGRAM

## 2025-01-01 PROCEDURE — 255N000002 HC RX 255 OP 636: Performed by: EMERGENCY MEDICINE

## 2025-01-01 PROCEDURE — 250N000009 HC RX 250: Performed by: EMERGENCY MEDICINE

## 2025-01-01 PROCEDURE — 99291 CRITICAL CARE FIRST HOUR: CPT | Mod: GC | Performed by: INTERNAL MEDICINE

## 2025-01-01 PROCEDURE — 96361 HYDRATE IV INFUSION ADD-ON: CPT

## 2025-01-01 PROCEDURE — 258N000003 HC RX IP 258 OP 636: Performed by: STUDENT IN AN ORGANIZED HEALTH CARE EDUCATION/TRAINING PROGRAM

## 2025-01-01 PROCEDURE — 99285 EMERGENCY DEPT VISIT HI MDM: CPT | Mod: 25

## 2025-01-01 PROCEDURE — 93010 ELECTROCARDIOGRAM REPORT: CPT | Performed by: INTERNAL MEDICINE

## 2025-01-01 PROCEDURE — 93005 ELECTROCARDIOGRAM TRACING: CPT | Mod: 76

## 2025-01-01 PROCEDURE — 83880 ASSAY OF NATRIURETIC PEPTIDE: CPT | Performed by: EMERGENCY MEDICINE

## 2025-01-01 PROCEDURE — 3E043XZ INTRODUCTION OF VASOPRESSOR INTO CENTRAL VEIN, PERCUTANEOUS APPROACH: ICD-10-PCS | Performed by: INTERNAL MEDICINE

## 2025-01-01 PROCEDURE — 96366 THER/PROPH/DIAG IV INF ADDON: CPT

## 2025-01-01 PROCEDURE — 250N000011 HC RX IP 250 OP 636: Performed by: STUDENT IN AN ORGANIZED HEALTH CARE EDUCATION/TRAINING PROGRAM

## 2025-01-01 PROCEDURE — 82330 ASSAY OF CALCIUM: CPT | Performed by: STUDENT IN AN ORGANIZED HEALTH CARE EDUCATION/TRAINING PROGRAM

## 2025-01-01 PROCEDURE — 999N000157 HC STATISTIC RCP TIME EA 10 MIN

## 2025-01-01 PROCEDURE — 5A09357 ASSISTANCE WITH RESPIRATORY VENTILATION, LESS THAN 24 CONSECUTIVE HOURS, CONTINUOUS POSITIVE AIRWAY PRESSURE: ICD-10-PCS | Performed by: EMERGENCY MEDICINE

## 2025-01-01 PROCEDURE — 85025 COMPLETE CBC W/AUTO DIFF WBC: CPT | Performed by: STUDENT IN AN ORGANIZED HEALTH CARE EDUCATION/TRAINING PROGRAM

## 2025-01-01 PROCEDURE — 82805 BLOOD GASES W/O2 SATURATION: CPT | Performed by: STUDENT IN AN ORGANIZED HEALTH CARE EDUCATION/TRAINING PROGRAM

## 2025-01-01 PROCEDURE — 36415 COLL VENOUS BLD VENIPUNCTURE: CPT | Performed by: EMERGENCY MEDICINE

## 2025-01-01 PROCEDURE — 84484 ASSAY OF TROPONIN QUANT: CPT | Performed by: EMERGENCY MEDICINE

## 2025-01-01 PROCEDURE — 83605 ASSAY OF LACTIC ACID: CPT

## 2025-01-01 PROCEDURE — 250N000009 HC RX 250: Performed by: INTERNAL MEDICINE

## 2025-01-01 PROCEDURE — 200N000001 HC R&B ICU

## 2025-01-01 PROCEDURE — 85048 AUTOMATED LEUKOCYTE COUNT: CPT | Performed by: EMERGENCY MEDICINE

## 2025-01-01 PROCEDURE — 93005 ELECTROCARDIOGRAM TRACING: CPT

## 2025-01-01 PROCEDURE — 96367 TX/PROPH/DG ADDL SEQ IV INF: CPT

## 2025-01-01 PROCEDURE — 84155 ASSAY OF PROTEIN SERUM: CPT | Performed by: STUDENT IN AN ORGANIZED HEALTH CARE EDUCATION/TRAINING PROGRAM

## 2025-01-01 PROCEDURE — 93306 TTE W/DOPPLER COMPLETE: CPT | Mod: 26 | Performed by: INTERNAL MEDICINE

## 2025-01-01 PROCEDURE — 999N000208 ECHOCARDIOGRAM COMPLETE

## 2025-01-01 PROCEDURE — 71045 X-RAY EXAM CHEST 1 VIEW: CPT

## 2025-01-01 PROCEDURE — 82803 BLOOD GASES ANY COMBINATION: CPT

## 2025-01-01 PROCEDURE — 84075 ASSAY ALKALINE PHOSPHATASE: CPT | Performed by: STUDENT IN AN ORGANIZED HEALTH CARE EDUCATION/TRAINING PROGRAM

## 2025-01-01 PROCEDURE — 250N000009 HC RX 250: Performed by: STUDENT IN AN ORGANIZED HEALTH CARE EDUCATION/TRAINING PROGRAM

## 2025-01-01 PROCEDURE — 94002 VENT MGMT INPAT INIT DAY: CPT

## 2025-01-01 PROCEDURE — 87637 SARSCOV2&INF A&B&RSV AMP PRB: CPT | Performed by: EMERGENCY MEDICINE

## 2025-01-01 PROCEDURE — 250N000011 HC RX IP 250 OP 636: Performed by: EMERGENCY MEDICINE

## 2025-01-01 PROCEDURE — 272N000017 HC KIT MONITOR CVP

## 2025-01-01 PROCEDURE — 84100 ASSAY OF PHOSPHORUS: CPT | Performed by: STUDENT IN AN ORGANIZED HEALTH CARE EDUCATION/TRAINING PROGRAM

## 2025-01-01 PROCEDURE — 96365 THER/PROPH/DIAG IV INF INIT: CPT | Mod: 59

## 2025-01-01 PROCEDURE — 80053 COMPREHEN METABOLIC PANEL: CPT | Performed by: EMERGENCY MEDICINE

## 2025-01-01 PROCEDURE — 999N000065 XR CHEST PORT 1 VIEW

## 2025-01-01 PROCEDURE — 87040 BLOOD CULTURE FOR BACTERIA: CPT | Performed by: EMERGENCY MEDICINE

## 2025-01-01 PROCEDURE — 5A1935Z RESPIRATORY VENTILATION, LESS THAN 24 CONSECUTIVE HOURS: ICD-10-PCS | Performed by: INTERNAL MEDICINE

## 2025-01-01 PROCEDURE — 31500 INSERT EMERGENCY AIRWAY: CPT

## 2025-01-01 PROCEDURE — 99292 CRITICAL CARE ADDL 30 MIN: CPT | Mod: GC | Performed by: INTERNAL MEDICINE

## 2025-01-01 PROCEDURE — 83735 ASSAY OF MAGNESIUM: CPT | Performed by: STUDENT IN AN ORGANIZED HEALTH CARE EDUCATION/TRAINING PROGRAM

## 2025-01-01 RX ORDER — EPINEPHRINE IN 0.9 % SOD CHLOR 5 MG/250ML
.01-.3 PLASTIC BAG, INJECTION (ML) INTRAVENOUS CONTINUOUS
Status: DISCONTINUED | OUTPATIENT
Start: 2025-01-01 | End: 2025-01-01 | Stop reason: HOSPADM

## 2025-01-01 RX ORDER — PROPOFOL 10 MG/ML
5-75 INJECTION, EMULSION INTRAVENOUS CONTINUOUS
Status: DISCONTINUED | OUTPATIENT
Start: 2025-01-01 | End: 2025-01-01 | Stop reason: HOSPADM

## 2025-01-01 RX ORDER — DEXTROSE MONOHYDRATE 100 MG/ML
INJECTION, SOLUTION INTRAVENOUS CONTINUOUS PRN
Status: DISCONTINUED | OUTPATIENT
Start: 2025-01-01 | End: 2025-01-01 | Stop reason: HOSPADM

## 2025-01-01 RX ORDER — ALBUTEROL SULFATE 0.83 MG/ML
2.5 SOLUTION RESPIRATORY (INHALATION) EVERY 4 HOURS PRN
Status: DISCONTINUED | OUTPATIENT
Start: 2025-01-01 | End: 2025-01-01 | Stop reason: HOSPADM

## 2025-01-01 RX ORDER — ETOMIDATE 2 MG/ML
16 INJECTION INTRAVENOUS ONCE
Status: COMPLETED | OUTPATIENT
Start: 2025-01-01 | End: 2025-01-01

## 2025-01-01 RX ORDER — NALOXONE HYDROCHLORIDE 0.4 MG/ML
0.4 INJECTION, SOLUTION INTRAMUSCULAR; INTRAVENOUS; SUBCUTANEOUS
Status: DISCONTINUED | OUTPATIENT
Start: 2025-01-01 | End: 2025-01-01 | Stop reason: HOSPADM

## 2025-01-01 RX ORDER — PIPERACILLIN SODIUM, TAZOBACTAM SODIUM 4; .5 G/20ML; G/20ML
4.5 INJECTION, POWDER, LYOPHILIZED, FOR SOLUTION INTRAVENOUS ONCE
Status: COMPLETED | OUTPATIENT
Start: 2025-01-01 | End: 2025-01-01

## 2025-01-01 RX ORDER — NICOTINE POLACRILEX 4 MG
15-30 LOZENGE BUCCAL
Status: DISCONTINUED | OUTPATIENT
Start: 2025-01-01 | End: 2025-01-01

## 2025-01-01 RX ORDER — HYDROCORTISONE SODIUM SUCCINATE 100 MG/2ML
100 INJECTION INTRAMUSCULAR; INTRAVENOUS EVERY 6 HOURS
Status: DISCONTINUED | OUTPATIENT
Start: 2025-01-01 | End: 2025-01-01 | Stop reason: HOSPADM

## 2025-01-01 RX ORDER — DEXTROSE MONOHYDRATE 25 G/50ML
25-50 INJECTION, SOLUTION INTRAVENOUS
Status: DISCONTINUED | OUTPATIENT
Start: 2025-01-01 | End: 2025-01-01

## 2025-01-01 RX ORDER — NOREPINEPHRINE BITARTRATE 0.02 MG/ML
.01-.6 INJECTION, SOLUTION INTRAVENOUS CONTINUOUS
Status: DISCONTINUED | OUTPATIENT
Start: 2025-01-01 | End: 2025-01-01 | Stop reason: HOSPADM

## 2025-01-01 RX ORDER — CHLORHEXIDINE GLUCONATE ORAL RINSE 1.2 MG/ML
15 SOLUTION DENTAL EVERY 12 HOURS
Status: DISCONTINUED | OUTPATIENT
Start: 2025-01-01 | End: 2025-01-01 | Stop reason: HOSPADM

## 2025-01-01 RX ORDER — PIPERACILLIN SODIUM, TAZOBACTAM SODIUM 3; .375 G/15ML; G/15ML
3.38 INJECTION, POWDER, LYOPHILIZED, FOR SOLUTION INTRAVENOUS EVERY 6 HOURS
Status: DISCONTINUED | OUTPATIENT
Start: 2025-01-01 | End: 2025-01-01 | Stop reason: HOSPADM

## 2025-01-01 RX ORDER — NALOXONE HYDROCHLORIDE 0.4 MG/ML
0.2 INJECTION, SOLUTION INTRAMUSCULAR; INTRAVENOUS; SUBCUTANEOUS
Status: DISCONTINUED | OUTPATIENT
Start: 2025-01-01 | End: 2025-01-01 | Stop reason: HOSPADM

## 2025-01-01 RX ORDER — NICOTINE POLACRILEX 4 MG
15-30 LOZENGE BUCCAL
Status: DISCONTINUED | OUTPATIENT
Start: 2025-01-01 | End: 2025-01-01 | Stop reason: HOSPADM

## 2025-01-01 RX ORDER — FENTANYL CITRATE 0.05 MG/ML
25-50 INJECTION, SOLUTION INTRAMUSCULAR; INTRAVENOUS
Status: DISCONTINUED | OUTPATIENT
Start: 2025-01-01 | End: 2025-01-01 | Stop reason: HOSPADM

## 2025-01-01 RX ORDER — HEPARIN SODIUM 10000 [USP'U]/100ML
0-5000 INJECTION, SOLUTION INTRAVENOUS CONTINUOUS
Status: DISCONTINUED | OUTPATIENT
Start: 2025-01-01 | End: 2025-01-01

## 2025-01-01 RX ORDER — IPRATROPIUM BROMIDE AND ALBUTEROL SULFATE 2.5; .5 MG/3ML; MG/3ML
SOLUTION RESPIRATORY (INHALATION)
Status: DISCONTINUED
Start: 2025-01-01 | End: 2025-01-01 | Stop reason: HOSPADM

## 2025-01-01 RX ORDER — HEPARIN SODIUM 10000 [USP'U]/100ML
0-5000 INJECTION, SOLUTION INTRAVENOUS CONTINUOUS
Status: DISCONTINUED | OUTPATIENT
Start: 2025-01-01 | End: 2025-01-01 | Stop reason: HOSPADM

## 2025-01-01 RX ORDER — FENTANYL CITRATE 50 UG/ML
50 INJECTION, SOLUTION INTRAMUSCULAR; INTRAVENOUS
Status: DISCONTINUED | OUTPATIENT
Start: 2025-01-01 | End: 2025-01-01 | Stop reason: HOSPADM

## 2025-01-01 RX ORDER — AZITHROMYCIN MONOHYDRATE 500 MG/5ML
500 INJECTION, POWDER, LYOPHILIZED, FOR SOLUTION INTRAVENOUS ONCE
Status: COMPLETED | OUTPATIENT
Start: 2025-01-01 | End: 2025-01-01

## 2025-01-01 RX ORDER — ROPIVACAINE IN 0.9% SOD CHL/PF 0.1 %
.01-.125 PLASTIC BAG, INJECTION (ML) EPIDURAL CONTINUOUS
Status: DISCONTINUED | OUTPATIENT
Start: 2025-01-01 | End: 2025-01-01

## 2025-01-01 RX ORDER — PROPOFOL 10 MG/ML
5-75 INJECTION, EMULSION INTRAVENOUS CONTINUOUS
Status: DISCONTINUED | OUTPATIENT
Start: 2025-01-01 | End: 2025-01-01

## 2025-01-01 RX ORDER — DEXTROSE MONOHYDRATE 25 G/50ML
25-50 INJECTION, SOLUTION INTRAVENOUS
Status: DISCONTINUED | OUTPATIENT
Start: 2025-01-01 | End: 2025-01-01 | Stop reason: HOSPADM

## 2025-01-01 RX ADMIN — VANCOMYCIN HYDROCHLORIDE 1750 MG: 10 INJECTION, POWDER, LYOPHILIZED, FOR SOLUTION INTRAVENOUS at 23:04

## 2025-01-01 RX ADMIN — PROPOFOL 30 MCG/KG/MIN: 10 INJECTION, EMULSION INTRAVENOUS at 23:56

## 2025-01-01 RX ADMIN — PIPERACILLIN AND TAZOBACTAM 3.38 G: 3; .375 INJECTION, POWDER, FOR SOLUTION INTRAVENOUS at 22:22

## 2025-01-01 RX ADMIN — AZITHROMYCIN MONOHYDRATE 500 MG: 500 INJECTION, POWDER, LYOPHILIZED, FOR SOLUTION INTRAVENOUS at 17:26

## 2025-01-01 RX ADMIN — EPINEPHRINE 0.03 MCG/KG/MIN: 1 INJECTION INTRAMUSCULAR; INTRAVENOUS; SUBCUTANEOUS at 22:18

## 2025-01-01 RX ADMIN — HYDROCORTISONE SODIUM SUCCINATE 100 MG: 100 INJECTION, POWDER, FOR SOLUTION INTRAMUSCULAR; INTRAVENOUS at 22:48

## 2025-01-01 RX ADMIN — PERFLUTREN 10 ML: 6.52 INJECTION, SUSPENSION INTRAVENOUS at 22:20

## 2025-01-01 RX ADMIN — SODIUM BICARBONATE 100 MEQ: 84 INJECTION, SOLUTION INTRAVENOUS at 22:24

## 2025-01-01 RX ADMIN — NOREPINEPHRINE BITARTRATE 0.03 MCG/KG/MIN: 0.02 INJECTION, SOLUTION INTRAVENOUS at 17:56

## 2025-01-01 RX ADMIN — SODIUM CHLORIDE 1000 ML: 9 INJECTION, SOLUTION INTRAVENOUS at 17:25

## 2025-01-01 RX ADMIN — FENTANYL CITRATE 50 MCG: 50 INJECTION, SOLUTION INTRAMUSCULAR; INTRAVENOUS at 19:31

## 2025-01-01 RX ADMIN — PIPERACILLIN AND TAZOBACTAM 4.5 G: 4; .5 INJECTION, POWDER, FOR SOLUTION INTRAVENOUS at 16:49

## 2025-01-01 RX ADMIN — ROCURONIUM BROMIDE 80 MG: 10 INJECTION INTRAVENOUS at 18:26

## 2025-01-01 RX ADMIN — Medication 100 MEQ: at 22:24

## 2025-01-01 RX ADMIN — ETOMIDATE INJECTION 16 MG: 2 SOLUTION INTRAVENOUS at 18:25

## 2025-01-01 RX ADMIN — INSULIN HUMAN 3.5 UNITS/HR: 1 INJECTION, SOLUTION INTRAVENOUS at 22:01

## 2025-01-01 RX ADMIN — NOREPINEPHRINE BITARTRATE 0.6 MCG/KG/MIN: 0.02 INJECTION, SOLUTION INTRAVENOUS at 23:24

## 2025-01-01 RX ADMIN — SODIUM BICARBONATE 20 MEQ/HR: 84 INJECTION, SOLUTION INTRAVENOUS at 22:32

## 2025-01-01 RX ADMIN — NOREPINEPHRINE BITARTRATE 0.6 MCG/KG/MIN: 0.02 INJECTION, SOLUTION INTRAVENOUS at 20:50

## 2025-01-01 RX ADMIN — SODIUM CHLORIDE, POTASSIUM CHLORIDE, SODIUM LACTATE AND CALCIUM CHLORIDE 500 ML: 600; 310; 30; 20 INJECTION, SOLUTION INTRAVENOUS at 16:41

## 2025-01-01 RX ADMIN — VASOPRESSIN 2.4 UNITS/HR: 20 INJECTION, SOLUTION INTRAMUSCULAR; SUBCUTANEOUS at 21:08

## 2025-01-01 RX ADMIN — NOREPINEPHRINE BITARTRATE 0.5 MCG/KG/MIN: 0.02 INJECTION, SOLUTION INTRAVENOUS at 19:47

## 2025-01-01 RX ADMIN — HEPARIN SODIUM 1000 UNITS/HR: 10000 INJECTION, SOLUTION INTRAVENOUS at 19:37

## 2025-01-01 RX ADMIN — PROPOFOL 10 MCG/KG/MIN: 10 INJECTION, EMULSION INTRAVENOUS at 18:43

## 2025-01-01 ASSESSMENT — ACTIVITIES OF DAILY LIVING (ADL)
ADLS_ACUITY_SCORE: 42
WALKING_OR_CLIMBING_STAIRS_DIFFICULTY: YES
WALKING_OR_CLIMBING_STAIRS: OTHER (SEE COMMENTS)
ADLS_ACUITY_SCORE: 41
HEARING_DIFFICULTY_OR_DEAF: YES
DRESSING/BATHING_DIFFICULTY: NO
ADLS_ACUITY_SCORE: 41
WERE_AUXILIARY_AIDS_OFFERED?: NO
DIFFICULTY_COMMUNICATING: NO
ADLS_ACUITY_SCORE: 42
DESCRIBE_HEARING_LOSS: BILATERAL HEARING LOSS
ADLS_ACUITY_SCORE: 41
THE_FOLLOWING_AIDS_WERE_PROVIDED;: PATIENT DECLINED OFFER OF AUXILIARY AIDS
DIFFICULTY_EATING/SWALLOWING: NO
ADLS_ACUITY_SCORE: 41
DOING_ERRANDS_INDEPENDENTLY_DIFFICULTY: NO
WEAR_GLASSES_OR_BLIND: YES
ADLS_ACUITY_SCORE: 41
ADLS_ACUITY_SCORE: 41
ADLS_ACUITY_SCORE: 42
PATIENT'S_PREFERRED_MEANS_OF_COMMUNICATION: VERBAL
ADLS_ACUITY_SCORE: 41
ADLS_ACUITY_SCORE: 41
USE_OF_HEARING_ASSISTIVE_DEVICES: BILATERAL HEARING AIDS
TOILETING_ISSUES: NO
CONCENTRATING,_REMEMBERING_OR_MAKING_DECISIONS_DIFFICULTY: NO

## 2025-01-01 ASSESSMENT — COLUMBIA-SUICIDE SEVERITY RATING SCALE - C-SSRS
2. HAVE YOU ACTUALLY HAD ANY THOUGHTS OF KILLING YOURSELF IN THE PAST MONTH?: NO
6. HAVE YOU EVER DONE ANYTHING, STARTED TO DO ANYTHING, OR PREPARED TO DO ANYTHING TO END YOUR LIFE?: NO
1. IN THE PAST MONTH, HAVE YOU WISHED YOU WERE DEAD OR WISHED YOU COULD GO TO SLEEP AND NOT WAKE UP?: YES

## 2025-01-14 PROBLEM — A41.9 SEPTIC SHOCK (H): Status: ACTIVE | Noted: 2025-01-01

## 2025-01-14 PROBLEM — R65.21 SEPTIC SHOCK (H): Status: ACTIVE | Noted: 2025-01-01

## 2025-01-14 PROBLEM — J96.01 ACUTE RESPIRATORY FAILURE WITH HYPOXIA (H): Status: ACTIVE | Noted: 2025-01-01

## 2025-01-14 NOTE — ED NOTES
Bed: ST03  Expected date:   Expected time:   Means of arrival:   Comments:  [facility] [id number] sob sats 80% eta 5

## 2025-01-14 NOTE — PROVIDER NOTIFICATION
Patient placed on BiPAP S/T with the settings, patient parameters, and alarms listed below.    Patient utilizing a size medium facemask with minimal leak. Mepilex lite placed on bridge of the nose to protect skin integrity. FiO2 originally 100% now weaned to 70%. Duoneb treatments given x2 inline via [employer]. SpO2 good in the mid 90's.      01/14/25 1608   CPAP/BiPAP/Settings   BIPAP/CPAP On Standby On   IPAP/EPAP (cmH2O) 12/6   Rate (breaths/min) 16   Oxygen (%) 100   Timed Inspiration (sec) 0.9   IPAP RISE  Settings (V60) 2   CPAP/BiPAP Patient Parameters   IPAP (cm H2O) 12 cmH2O   EPAP (cm H2O) 6 cmH2O   Pressure Support (cm H2O) 6 cmH2O   RR Total (breaths/min) 43 breaths/min   Vt (mL) 603 mL   Minute Ventilation (L/min) 26.1 L/min   Peak Inspiratory Pressure (cm H2O) 14 cmH2O   Pt.  Leak (L/min) 12 L/min   CPAP/BiPAP/AVAPS/AVAPS AE Alarms   High Pressure (cm H2O) 25 cmH2O   Low Pressure (cm H2O) 5   Apnea (sec) 20   Lo Min Vent 2   High Rate (breaths/min) 45 breaths/min   Low Rate (breaths/min) 5   Audible Alarm set at (Volume of Alarm) 10   Humidifier Checked N/A     Will continue to monitor    RT Nahun on 1/14/2025 at 4:20 PM

## 2025-01-14 NOTE — ED PROVIDER NOTES
Emergency Department Note      History of Present Illness     Chief Complaint   Shortness of Breath      HPI   Rosa Cannon is a 84 year old female with a history of hypertension presenting to the ED via EMS with shortness of breath. Per EMS, patient has experienced a few days worth of worsening shortness of breath and cough. On arrival the patient returned O2 saturations in the 70s, prompting they place her on oxygen mask en route to the ED.     In discussion with the patient she actually is not complaining of much cough, sounds like she is been working to scoop her driveway over the last couple of days.  Overnight began feeling short of breath about midnight or 1 in the morning.  That worsened throughout the day today.  Denies any real chest pain or pressure, just sensation of difficulty breathing.  No recent fevers or vomiting.      Independent Historian   EMS as detailed above.    Review of External Notes   \    Past Medical History     Medical History and Problem List   Asymptomatic varicose veins ankle  Disorder of bone and cartilage  HTN  Hyperlipidemia  Obesity  Colonic polyps  Positive FIT  Retinal detachment  Visual loss  Urine leakage  Osteopenia  Uterovaginal prolapse    Medications   Norvasc  Lopressor    Surgical History   Right cataract IOL  Detached retina repair  Tonsillectomy  Colonoscopy through stoma with biopsy tumor  Blepharoplasty, both eyes     Physical Exam     Patient Vitals for the past 24 hrs:   BP Temp Temp src Pulse Resp SpO2 Weight   01/14/25 1916 102/60 -- -- (!) 129 -- 98 % --   01/14/25 1914 106/67 -- -- (!) 129 -- 99 % --   01/14/25 1912 103/73 -- -- (!) 131 -- 99 % --   01/14/25 1910 111/79 -- -- (!) 130 -- 99 % --   01/14/25 1908 101/74 -- -- (!) 131 -- 99 % --   01/14/25 1906 108/80 -- -- (!) 130 -- 98 % --   01/14/25 1904 111/83 -- -- (!) 131 -- 98 % --   01/14/25 1902 112/79 -- -- (!) 131 -- 98 % --   01/14/25 1900 118/85 -- -- (!) 131 24 98 % --   01/14/25 1858 124/86  -- -- (!) 131 18 98 % --   01/14/25 1856 (!) 129/91 -- -- (!) 133 18 97 % --   01/14/25 1852 (!) 134/91 -- -- (!) 133 -- 100 % --   01/14/25 1850 (!) 133/91 -- -- (!) 131 18 100 % --   01/14/25 1848 123/87 -- -- (!) 130 24 99 % --   01/14/25 1846 (!) 116/95 -- -- (!) 128 29 98 % --   01/14/25 1842 98/69 -- -- (!) 124 18 94 % --   01/14/25 1840 (!) 88/64 -- -- 120 18 -- --   01/14/25 1837 (!) 62/44 -- -- 112 (!) 47 -- --   01/14/25 1835 -- -- -- -- 21 94 % --   01/14/25 1834 (!) 64/47 -- -- 96 -- 99 % --   01/14/25 1830 (!) 103/27 -- -- 101 17 (!) 81 % --   01/14/25 1820 107/72 -- -- (!) 124 (!) 49 97 % --   01/14/25 1815 110/74 -- -- (!) 123 (!) 34 93 % --   01/14/25 1810 (!) 121/97 -- -- (!) 122 (!) 41 96 % --   01/14/25 1806 104/84 -- -- 120 -- 98 % --   01/14/25 1805 -- -- -- -- (!) 41 -- --   01/14/25 1755 102/85 -- -- 118 (!) 38 98 % --   01/14/25 1750 (!) 89/65 -- -- 114 (!) 42 99 % --   01/14/25 1745 92/64 -- -- 114 (!) 49 99 % 83.9 kg (185 lb)   01/14/25 1740 104/73 -- -- (!) 122 23 100 % --   01/14/25 1735 92/73 -- -- 120 (!) 39 99 % --   01/14/25 1730 99/69 -- -- (!) 122 (!) 37 98 % --   01/14/25 1725 97/66 -- -- (!) 122 (!) 34 98 % --   01/14/25 1720 104/64 -- -- (!) 123 (!) 39 98 % --   01/14/25 1715 104/72 -- -- (!) 124 (!) 34 97 % --   01/14/25 1710 104/69 -- -- (!) 128 (!) 33 95 % --   01/14/25 1705 101/76 -- -- (!) 123 27 97 % --   01/14/25 1700 92/61 -- -- (!) 125 (!) 40 94 % --   01/14/25 1656 -- -- -- (!) 126 25 96 % --   01/14/25 1655 (!) 87/59 -- -- (!) 126 -- -- --   01/14/25 1650 91/61 -- -- (!) 121 21 -- --   01/14/25 1645 91/69 -- -- (!) 128 (!) 46 -- --   01/14/25 1640 124/76 -- -- (!) 128 29 -- --   01/14/25 1610 (!) 142/88 97.4  F (36.3  C) Axillary (!) 131 (!) 41 (!) 83 % --   01/14/25 1602 138/56 -- -- (!) 135 (!) 37 (!) 82 % --     Gen: Appears ill nonrebreather in place  Oral : Mucous membranes dry,   Nose: No rhinorhea  Ears: External near normal, without drainage  Eyes: periorbital  tissues and sclera normal   Neck: supple, no abnormal swelling  Lungs: Coarse breaths bilaterally, tachypnea present speaks in shortened sentences  CV: Tachycardic  Abd: soft, nontender, nondistended, no rebound/guarding  Ext: Bilateral lower extremity edema  Skin: Cool dry  Neuro: alert, no gross motor or sensory deficits,   Psych: Anxious and uncomfortable appearing        Diagnostics     Lab Results   Labs Ordered and Resulted from Time of ED Arrival to Time of ED Departure   COMPREHENSIVE METABOLIC PANEL - Abnormal       Result Value    Sodium 133 (*)     Potassium 5.2      Carbon Dioxide (CO2) 18 (*)     Anion Gap 18 (*)     Urea Nitrogen 27.1 (*)     Creatinine 1.54 (*)     GFR Estimate 33 (*)     Calcium 9.0      Chloride 97 (*)     Glucose 208 (*)     Alkaline Phosphatase 87       (*)     ALT 28      Protein Total 8.6 (*)     Albumin 3.5      Bilirubin Total 0.5     TROPONIN T, HIGH SENSITIVITY - Abnormal    Troponin T, High Sensitivity 1,854 (*)    NT PROBNP INPATIENT - Abnormal    N terminal Pro BNP Inpatient 27,426 (*)    CBC WITH PLATELETS AND DIFFERENTIAL - Abnormal    WBC Count 22.5 (*)     RBC Count 4.10      Hemoglobin 12.3      Hematocrit 38.6      MCV 94      MCH 30.0      MCHC 31.9      RDW 14.3      Platelet Count 305      % Neutrophils 89      % Lymphocytes 6      % Monocytes 4      % Eosinophils 0      % Basophils 0      % Immature Granulocytes 1      NRBCs per 100 WBC 0      Absolute Neutrophils 19.9 (*)     Absolute Lymphocytes 1.4      Absolute Monocytes 1.0      Absolute Eosinophils 0.0      Absolute Basophils 0.0      Absolute Immature Granulocytes 0.2      Absolute NRBCs 0.0     ISTAT GASES LACTATE VENOUS POCT - Abnormal    Lactic Acid POCT 5.3 (*)     Bicarbonate Venous POCT 22      O2 Sat, Venous POCT 27 (*)     pCO2 Venous POCT 51 (*)     pH Venous POCT 7.24 (*)     pO2 Venous POCT 21 (*)     Base Excess/Deficit (+/-) POCT -6.0 (*)    ISTAT GASES LACTATE VENOUS POCT - Abnormal     Lactic Acid POCT 5.6 (*)     Bicarbonate Venous POCT 19 (*)     O2 Sat, Venous POCT 31 (*)     pCO2 Venous POCT 44      pH Venous POCT 7.24 (*)     pO2 Venous POCT 23 (*)     Base Excess/Deficit (+/-) POCT -8.0 (*)    TROPONIN T, HIGH SENSITIVITY - Abnormal    Troponin T, High Sensitivity 1,788 (*)    INFLUENZA A/B, RSV AND SARS-COV2 PCR - Normal    Influenza A PCR Negative      Influenza B PCR Negative      RSV PCR Negative      SARS CoV2 PCR Negative     LACTIC ACID WHOLE BLOOD   CBC WITH PLATELETS   BLOOD CULTURE   BLOOD CULTURE       Imaging   XR Chest Port 1 View   Final Result   IMPRESSION: Endotracheal tube tip 1.9 cm from the sukh. Nearly confluent right-sided infiltrates are increased from previous. Retrocardiac atelectasis and/or infiltrate similar to previous. Right IJ line noted with tip near the atriocaval junction.      XR Chest Port 1 View   Final Result   IMPRESSION: Prominent consolidation throughout the central right lung. Mild patchy opacities at the left lung base. Correlate with multifocal pneumonia. Bilateral vascular congestion also noted that may represent pulmonary edema. Borderline prominent    cardiac silhouette.      POC US ECHO LIMITED    (Results Pending)   Echocardiogram Complete    (Results Pending)     ECG results from 01/14/25   EKG 12-lead, tracing only     Value    Systolic Blood Pressure     Diastolic Blood Pressure     Ventricular Rate 137    Atrial Rate 137    HI Interval 158    QRS Duration 92        QTc 392    P Axis 60    R AXIS 101    T Axis 100    Interpretation ECG      Sinus tachycardia  Rightward axis  Nonspecific ST and T wave abnormality  Abnormal ECG  When compared with ECG of 03-Aug-2009 19:11,  MANUAL COMPARISON REQUIRED PREVIOUS ECG IS INCOMPATIBLE  Confirmed by GENERATED REPORT, COMPUTER ([id number]),  Zaid Rolon ([id number]) on 1/14/2025 4:05:58 PM         Independent Interpretation   CXR: Bilateral infiltrate.    ED Course      Medications  Administered   Medications   ipratropium - albuterol 0.5 mg/2.5 mg/3 mL (DUONEB) 0.5-2.5 (3) MG/3ML neb solution (has no administration in time range)   sodium chloride (PF) 0.9% PF flush 3 mL (has no administration in time range)   sodium chloride (PF) 0.9% PF flush 3 mL (3 mLs Intracatheter Not Given 1/14/25 1731)   norepinephrine (LEVOPHED) 4 mg in  mL PERIPHERAL infusion (0.12 mcg/kg/min × 83.9 kg Intravenous Rate/Dose Change 1/14/25 1931)   propofol (DIPRIVAN) infusion (30 mcg/kg/min × 83.9 kg Intravenous Rate/Dose Change 1/14/25 1926)   fentaNYL (PF) (SUBLIMAZE) injection 50 mcg (50 mcg Intravenous $Given 1/14/25 1931)   heparin loading dose for LOW INTENSITY TREATMENT * Give BEFORE starting heparin infusion (has no administration in time range)   heparin 25,000 units in 0.45% NaCl 250 mL ANTICOAGULANT infusion (has no administration in time range)   piperacillin-tazobactam (ZOSYN) 4.5 g vial to attach to  mL bag (0 g Intravenous Stopped 1/14/25 1725)   azithromycin (ZITHROMAX) 500 mg vial to attach to  mL bag (0 mg Intravenous Stopped 1/14/25 1926)   lactated ringers BOLUS 500 mL (0 mLs Intravenous Stopped 1/14/25 1927)   sodium chloride 0.9% BOLUS 1,572 mL (1,000 mLs Intravenous $New Bag 1/14/25 1725)   etomidate (AMIDATE) injection 16 mg (16 mg Intravenous $Given 1/14/25 1825)   rocuronium injection 80 mg (80 mg Intravenous $Given 1/14/25 1826)       Procedures   Procedures      Rapid Sequence Intubation      Procedure: Rapid Sequence Intubation    Consent: Verbal from Patient     Risks Discussed: Unable/emergent    Indication: Respiratory failure    Preparation: Preoxygenation with CPAP/BiPAP. Premedication with None.    Sedation: Etomidate     Paralytic: Rocuronium    Procedure Detail:   The patient was intubated with a 7.5 endotracheal tube using Video Laryngoscopy.  Following intubation, the patient's breath sounds were Equal.  ET Tube placement was confirmed with Auscultation and  Chest X-ray.    Monitoring: Monitoring consisted of heart rate, cardiac monitor, continuous pulse oximetry, blood pressure checks, level of consciousness, IV access, constant attendance by RN, and MD attendance until patient stable or transfer of care.      Patient Status: The patient tolerated the procedure well: Yes. There were no complications.    Central Line Placement     Procedure:  Central Venous Catheter Insertion     Indication: Vasopressor administration    Consent:  Verbal from Family  Risk Discussed: Unable/emergent    Universal Protocol: Universal protocol was followed and time out conducted just prior to starting procedure, confirming patient identity, site/side, procedure, patient position, and availability of correct equipment and implants.     Anesthesia/Sedation: propofol infusion    Procedure Detail:    Central line bundle elements were complete including preparatory hand leansing, donning full barrier precautions, use of a full body drape and chlorhxidine gluconate skin prep.   The Left internal jugular vein was selected as the optimal location for patient s condition.   Ultrasound was utilized for guidance: Yes, see separate procedural guidance POCUS note   A finder needle was used to enter the vein, through which a guidewire was inserted. The finder needle was then removed and the tract was dilated.   A triple lumen central venous catheter was inserted over the guidewire without difficulty. The guidewire was removed and the catheter was sutured in place and covered with an appropriate dressing.   A post-procedure chest radiograph was performed.     Patient Status:  The patient tolerated the procedure well: Yes. There were no complications.        Discussion of Management   Intensivist  Cardiologist    ED Course   ED Course as of 01/14/25 1933 Tue Jan 14, 2025   1610 I obtained history and examined the patient as noted above.   1658 I rechecked the patient.    1752 I rechecked the patient.   "      Additional Documentation  None    Medical Decision Making / Diagnosis     CMS Diagnoses: The patient has signs of sepsis   Sepsis ED evaluation   The patient has signs of sepsis as evidenced by:  1. Presence of 2 SIRS criteria, suspected infection, AND  2. Organ dysfunction: Elevated lactic    Time zero:  1633  on 01/14/25 as this was the time when  lactate resulted, raising suspicion for bacterial infection and Lactate was resulted and the level was greater than 2.    Lactic Acid Results:  Recent Labs   Lab Test 01/14/25  1809 01/14/25  1633   LACT 5.6* 5.3*       3 Hour Bundle 6 Hour Bundle (Reassessment)   Blood Cultures before IV Antibiotics: Yes  Antibiotics given: see below  Prehospital fluid volume (mL):                     Total fluids given (ED +Pre-hospital):  Full 30 mL/kg bolus intentionally NOT administered to this patient due to CHF and acute Pulmonary Edema. The target volume to infuse in this patient is 1000.   Repeat Lactic Acid Level: Ordered by reflex for 2 hours after initial lactic acid collection.  Vasopressors: Vasopressors started for septic shock due to persistent hypotension.  Repeat perfusion exam: I attest to having performed a repeat sepsis exam and assessment of perfusion at  1845 .   BMI Readings from Last 1 Encounters:   01/14/25 32.77 kg/m        Anti-infectives (From admission through now)      Start     Dose/Rate Route Frequency Ordered Stop    01/14/25 1605  piperacillin-tazobactam (ZOSYN) 4.5 g vial to attach to  mL bag        Note to Pharmacy: For [facility], [facility] and [facility]: For Zosyn-naive patients, use the \"Zosyn initial dose + extended infusion\" order panel.    4.5 g  over 30 Minutes Intravenous ONCE 01/14/25 1604 01/14/25 1725    01/14/25 1605  azithromycin (ZITHROMAX) 500 mg vial to attach to  mL bag         500 mg  over 1 Hours Intravenous ONCE 01/14/25 1604 01/14/25 1926             and None    [facility]       None    [facility]   Rosa Cannon is a 84 year old female " presents the emergency department respiratory failure, initial concern for pneumonia and septic shock given lactate greater than 4.  Patient became hypotensive, was on BiPAP for approximately an hour with no improvement in work of breathing.  Initially started a 500 NS bolus given my concern for heart failure, however when patient developed hypotension completed a full 30 mL/kg bolus without improvement so peripheral norepinephrine was initiated.  Had a discussion with patient and her family and they do want maximal treatment options as patient has a high quality of life at home with her .  Patient was intubated due to respiratory distress and work of breathing, I placed central line in the right IJ and norepinephrine was changed over to that port.  Spoke to the intensivist, sounds like we will have a bed at [facility].  Spoke with cardiologist on-call who is in agreement with a stat echo.  They also recommended starting heparin for now.  Will plan on trending troponins, they will follow along with patient on admission.  Could be a cardiogenic shock picture as well, my bedside echo shows hyperdynamic myocardium.  Broad-spectrum antibiotics given.    Please bill 95 minutes critical care time for management of this patient independent of procedures and teaching    Disposition   The patient was admitted to the hospital.     Diagnosis     ICD-10-CM    1. Septic shock (H)  A41.9     R65.21       2. Acute respiratory failure with hypoxia (H)  J96.01       3. NSTEMI (non-ST elevated myocardial infarction) (H)  I21.4       4. Acute congestive heart failure, unspecified heart failure type (H)  I50.9       5. SHARLENE (acute kidney injury)  N17.9            Discharge Medications   New Prescriptions    No medications on file         Scribe Disclosure:  I, Luis Silveira, am serving as a scribe at 4:46 PM on 1/14/2025 to document services personally performed by Andrew Pineda, based on my observations and the provider's  statements to me.        Andrew Pineda MD  01/14/25 1935

## 2025-01-14 NOTE — ED TRIAGE NOTES
PT BIBA from home. Pt in severe resp distress, moaning as breathing. Pt alert, anxious. Pt on 10L mask and 6L NC on arrival. Pt changed to 15L NRB mask in ED. Pt was 83% on RA for EMS and increased to 93% when oxygen applied. Pt with hx of HTN.     Triage Assessment (Adult)       Row Name 01/14/25 1555          Triage Assessment    Airway WDL WDL        Respiratory WDL    Respiratory WDL X;rhythm/pattern;cough     Rhythm/Pattern, Respiratory shortness of breath;grunting;labored     Cough Frequency infrequent        Skin Circulation/Temperature WDL    Skin Circulation/Temperature WDL X;circulation;temperature     Skin Circulation cyanosis  fingers and toes     Skin Temperature cool        Cardiac WDL    Cardiac WDL X;rhythm     Pulse Rate & Regularity tachycardic     Cardiac Rhythm ST        Peripheral/Neurovascular WDL    Peripheral Neurovascular WDL WDL        Cognitive/Neuro/Behavioral WDL    Cognitive/Neuro/Behavioral WDL WDL

## 2025-01-15 NOTE — PROCEDURES
Limited Bedside Cardiac Ultrasound, performed and interpreted by me.   Indication: Elevated Troponin and Hypotension/shock.  parasternal short axis and apical 4 chamber views were acquired.   Image quality was challenging due to body habitus and critical illness    Findings:    Abnormal global hypokinesis, no pericardial effusion    IMPRESSION: Abnormal limited cardiac ultrasound showing global hypokinesis.  No pericardial effusion.

## 2025-01-15 NOTE — PROGRESS NOTES
JOSHUA: Writer spoke with CORNEL Amezcua. Family has called concerned on caring for their Dad at home. JOSHUA called daughter Tabby ([phone]) and spoke with her and her sisters regarding possible care for their Dad. Tabby states that she believes she is POA as they found a document stating that her Mom was the POA for her dad and they are alternate. SW encouraged family to get the Dad into his Primary. SW shared that their Moms primary was  at [facility]. SW encouraged to call [facility] and see if they can see who their Dads primary is and if no primary at all, to establish care as soon as possible. SW asked about who will be  caring for their Dad, at this time they are staying with him and they are going to look for Memory Care options but needs to figure out finances. SW encouraged them to call the bank explaining the situation and see if they can get a glimpse into what their finances are. Family was going to look for a bank statement first and go from there. SW encouraged  them to call [facility] as they can be a good resource as well. SW provided contact number and if any other questions or concerns come up they can call this writer as JOSHUA is on service until Friday.     ULYSSES Morales

## 2025-01-15 NOTE — PHARMACY-VANCOMYCIN DOSING SERVICE
"Pharmacy Vancomycin Initial Note  Date of Service 2025  Patient's  1940  84 year old, female    Indication: Sepsis    Current estimated CrCl = Estimated Creatinine Clearance: 29.4 mL/min (A) (based on SCr of 1.54 mg/dL (H)).    Creatinine for last 3 days  2025:  4:27 PM Creatinine 1.54 mg/dL    Nephrotoxins and other renal medications (From now, onward)      Start     Dose/Rate Route Frequency Ordered Stop    25 2200  piperacillin-tazobactam (ZOSYN) 3.375 g vial to attach to  mL bag        Note to Pharmacy: For [facility], [facility] and [facility]: For Zosyn-naive patients, use the \"Zosyn initial dose + extended infusion\" order panel.    3.375 g  over 30 Minutes Intravenous EVERY 6 HOURS 25  vancomycin (VANCOCIN) 1,750 mg in 0.9% NaCl 517.5 mL intermittent infusion         1,750 mg  over 2 Hours Intravenous ONCE 25  vancomycin place winters - receiving intermittent dosing         1 each Does not apply SEE ADMIN INSTRUCTIONS 25  vasopressin 0.2 units/mL in NS (PITRESSIN) standard conc infusion         2.4 Units/hr  12 mL/hr  Intravenous CONTINUOUS 25  norepinephrine (LEVOPHED) 4 mg in  mL infusion PREMIX         0.01-0.6 mcg/kg/min × 83.9 kg  3.1-188.8 mL/hr  Intravenous CONTINUOUS 25                    Plan:  Start vancomycin  1750 mg IV x1. Intermittent dosing due to SHARLENE  Vancomycin monitoring method: AUC  Vancomycin therapeutic monitoring goal: 400-600 mg*h/L  Pharmacy will check vancomycin levels as appropriate in 1-3 Days.    Serum creatinine levels will be ordered every 48 hours.      Jolene Eduardo RPH    "

## 2025-01-15 NOTE — PHARMACY-ADMISSION MEDICATION HISTORY
Pharmacist Admission Medication History    Admission medication history is complete. The information provided in this note is only as accurate as the sources available at the time of the update.    Information Source(s): Family member, Clinic records, and [facility]where[facility] via in-person    Pertinent Information: Family members present did not know what meds the patient takes at home. Spouse has dementia per notes. Pt is intubated.  RX meds have been currently filled per sure scripts and pt had annual Medicare visit 10/4/24.     Changes made to PTA medication list:  Added: None  Deleted: None  Changed: None    Allergies reviewed with patient and updates made in EHR: no    Medication History Completed By: Jolene Eduardo RPH 1/14/2025 10:36 PM    PTA Med List   Medication Sig Note Last Dose/Taking    amLODIPine (NORVASC) 5 MG tablet Take 1 tablet (5 mg) by mouth daily. 1/14/2025: Filled 11/3/24 for 90 day supply per sure scripts Taking    metoprolol tartrate (LOPRESSOR) 50 MG tablet TAKE 1 TABLET BY MOUTH TWICE A DAY 1/14/2025: Filled 11/7/24 for 90 day supply per sure scripts Taking

## 2025-01-15 NOTE — PROVIDER NOTIFICATION
After trial of BiPAP patient was intubated in STO3 at around 1830 with a size 7.5 ETT secured to the lips at 23 cm by ETAD. Placement confirmed via bilateral breath sounds and positive colorimetric CO2 detector, chest-xray pending. The following ventilatory settings, patient parameters, and alarms are listed below:     01/14/25 1835   Ventilator Settings    Vent Mode CMV/AC   Resp Rate (Set) 18 breaths/min   Tidal Volume (Set, mL) 450 mL   FiO2 100 %   PEEP (cm H2O) 12 cmH2O   Inspiratory Time (sec) 0.9 sec   Vent Humidifier - Heater/HME HME   Ventilator - Patient    Patient Resp Rate  18 breaths/min   Expiratory Vt (ml) 431   Minute Volume (ml) 7.9 L/min   Peak Inspiratory Pressure (cm H2O) 31 cmH2O   Mean Airway Pressure (cm H2O) 18 cmH2O   Ventilator Alarms   High Inspiratory Pressure Alarm (cmH2O) 50 cm H2O   Low Inspiratory Pressure (cm H2O) 8 cm H2O   High Respiratory Rate (breaths/min) 45 breaths/min   Minute Ventilation High (L) 20 L/min   Minute Ventilation Low (L) 3 L/min   Apnea Delay (sec) 20 sec   External Alarm Functional and On Yes     SpO2 in the mid 90's, will continue to monitor closely.    Alek Valdivia, RT on 1/14/2025 at 6:50 PM

## 2025-01-15 NOTE — H&P
Resident/Fellow Attestation   I, Yobany Fierro MD, was present with the medical/BECKY student who participated in the service and in the documentation of the note.  I have verified the history and personally performed the physical exam and medical decision making.  I agree with the assessment and plan of care as documented in the note.      84 year old female with known HTN and otherwise poor health compliance who presented to the ED with SOB, weakness, and cough. She acutely decompensated in the ED requiring intubation and initiation of vasopressors. Evidence of acute stress cardiomyopathy on labs and ECHO. Unclear source at this time, though has R>L pulmonary infiltrates and may be related to severe sepsis from pneumonia. Discussed with cardiology and with global wall akinesis and no vascular distribution of injury, she is unlikely to benefit from angiography.     Ongoing evidence of poor perfusion with profound acidosis and borderline MAPs despite maximum dose of three vasopressors. Very guarded prognosis discussed with two daughters at bedside. They express that she would not want CPR in this setting.    - Continue norepi, vaso, epi with MAP goal > 65  - Ventilator settings optimized, RR increased to 26 to help with acidosis  - Broad spectrum antibiotics, obtain full set of cultures  - Stress dose steroids  - Bicarb gtt  - Heparin gtt in case underlying MI or PE  - Insulin gtt  - POCUS without evidence of fluid responsiveness  - Continue to monitor with flotrac  - Cardiology consult, appreciate recs  - q4h lactate and ABG  - Code status changed to DNR/OK to intubate    Yobany Fierro MD  [id number]  Date of Service (when I saw the patient): 01/14/25    [facility]    History and Physical - Hospitalist Service       Date of Admission:  1/14/2025    Assessment & Plan      Rosa Cannon is a 84 year old female admitted on 1/14/2025. She has a history of HTN and is admitted for acute  respiratory failure with hypoxia.     Patient developed shortness of breath overnight and called EMS in the afternoon of day of admission. On EMS arrival O2 sats in the 70s and oxygen mask placed. Upon arrival in the ED O2 sats 82%, RR 37, pulse 135, afebrile, and normotensive. Initially concerned for pneumonia and septic shock as her initial lactate was >4. Other labs remarkable for Cr of 1.54, AG 18, BNP 27,426, HS Troponin 1,854. Initial VBG with pH of 7.24, pCO2 51, pO2 21, Bicarb 22. WBC 22.5. Shortly after arrival she became hypotensive and was on BiPAP for ~1 hour without improvement in her work of breathing. Started on 500 NS bolus given concern for possible heart failure, but continued to be hypotensive. Transitioned to full 30 mL/kg bolus without improvement, thus peripheral norepinephrine was started. Eventually required intubation due to respiratory distress and continued increased work of breathing. A central line was placed in the right internal jugular and norepinephrine was transitioned there. Broad-spectrum antibiotics given as well. On call cardiology was called and agreed with stat echo and initiation of heparin. Bedside echo showed global hypokinesis. Admitted to ICU for further management.     Upon arrival in ICU persistently hypotensive despite fluids and norepinephrine. Started on vasopressin. POCUS showed poorly compressible IVC and redemonstrated hypokinetic and dilated heart. Family consented to left femoral arterial line placement.     Acute Respiratory Failure with Hypoxia  Acute Congestive Heart Failure  Pulmonary Edema  Septic Shock  Presented with shortness of breath and hypoxia. Respiratory status quickly declined, failing BiPAP and requiring intubation. Persistently hypotensive in the ED despite full 30 mL/kg bolus. Peripheral norepinephrine started which was eventually transitioned once left internal jugular central line was placed. Initially concerned for pneumonia and possible  septic shock. Initial labs remarkable for lactate of 5.6, BNP 27,426, VBG pH of 7.24, pCO2 51, pO2 21, Bicarb 22 and WBC 22.5. Broad spectrum antibiotics given. CXR demonstrated consolidation in central right lung possible pneumonia. Bilateral vascular congestion possibly pulmonary edema. Discussed with cardiology in ED and proceeded with heparin drip and stat echo. BS echo demonstrated global hypokinesis. Upon arrival in ICU POCUS redemonstrated hypokinesis as well as volume overload. Vasopressin initiated on arrival in ICU and arterial line placed. Lactate trending up to 8.2. Ventilator settings at CMV, RR 26, Vt 450, FiO2 80, PEEP 12.   She continued to require additional BP support and epinephrine was initiated. Repeat labs demonstrated increased lactate to 8.2, troponin to 2656, and bicarb of 12. Bicarb drip initiated. Discussed with Cardiology who agree with picture of global heart failure. Formal echo read with EF ~20% and new severe MR. Repeat EKG with concern for acute MI. Further discussion with family about patient's prognosis and ultimately decision was made to change her to DNR.   - Continue norepinephrine and titrate to BP  - Start vasopressin and titrate to BP  - Continue heparin  - Continue propofol  - Start epinephrine and titrate to BP  - Start bicarb  - STAT formal echo  - RT to manage ventilator  - Strict NPO  - Cardiology consult  - Code status change to DNR    NSTEMI  ED with elevated troponin to 1,854 which trended to 1,788->2,656. No complaints of chest pain. EKG demonstrated sinus tach without ST elevations.   - Trend troponins  - EKG PRN  - Cardiology Consult    SHARLENE  Cr noted to be elevated to 1.54 on arrival in ED and trended up to 1.93. No known kidney history. Hesitant to administer additional fluids given acute CHF and pulmonary edema.  - Repeat BMP  - Hold IV fluids          Diet: NPO for Medical/Clinical Reasons Except for: MedsNPO  DVT Prophylaxis: SCDs  Blanchard Catheter: PRESENT,  indication: ICU only: hourly urine output needed for patient care  Lines: PRESENT      CVC Triple Lumen Right Internal jugular-Site Assessment: WDL      Cardiac Monitoring: ACTIVE order. Indication: ICU  Code Status: No CPR- Pre-arrest intubation OKFull Code    Clinically Significant Risk Factors Present on Admission        # Hyperkalemia: Highest K = 5.7 mmol/L in last 2 days, will monitor as appropriate  # Hyponatremia: Lowest Na = 133 mmol/L in last 2 days, will monitor as appropriate  # Hypochloremia: Lowest Cl = 97 mmol/L in last 2 days, will monitor as appropriate     # Anion Gap Metabolic Acidosis: Highest Anion Gap = 20 mmol/L in last 2 days, will monitor and treat as appropriate  # Hypoalbuminemia: Lowest albumin = 3 g/dL at 1/14/2025  9:51 PM, will monitor as appropriate     # Hypertension: Noted on problem list  # Chronic heart failure with reduced ejection fraction: last echo with EF <40%  # Circulatory Shock: required vasopressors within past 24 hours      # Acute Hypoxic Respiratory Failure: Documented O2 saturation < 90%. Continue supplemental oxygen as needed  # Acute Hypercapnic Respiratory Failure: based on venous blood gas results.  Continue supplemental oxygen and ventilatory support as indicated.                   Disposition Plan      Expected Discharge Date: 01/16/2025                The patient's care was discussed with the Attending Physician, Dr. Bradley and Chief Resident/Fellow Dr. Fierro.       Colleen Galeas, MS4  Medical Student  Hospitalist Service  [facility]  Securely message with [employer] (more info)  Text page via [employer] Paging/Directory   ______________________________________________________________________    Chief Complaint   Shortness of breath     History is obtained from the electronic health record, emergency department physician, and patient's children    History of Present Illness   Rosa Cannon is a 84 year old female who has a history of  HTN and is admitted for acute respiratory failure with hypoxia.     Per ED note patient presented via EMS with shortness of breath which she had said to have been experiencing for a few days. On EMS arrival her O2 sats wer in the 70s and she was placed on an O2 mask en route. ED history reports she had been working to scoop her driveway in the past few days and overnight began to feel short of breath around midnight and it proceeded to worsen throughout the day prompting her to call 911 in the afternoon. No chest pain, fevers, or vomiting.     On arrival in the ICU, spoke with two of patients daughters. They report patient rarely goes to the doctor and does not share health details with them. 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       Past Medical History    Past Medical History:   Diagnosis Date    Allergic rhinitis, cause unspecified     eye issues - only a spring allergy - have been on rx for meds - allergra - march to may, patanol in the past     ASYMPTOMATIC VARICOSE VEINS spider type R ankle/ft 2/28/2006    Disorder of bone and cartilage, unspecified 2003     osteopenia of the femoral neck    Fam hx-cardiovas dis NEC     mother and father and brother - CAD     FEMALE GENITAL SYMPTOMS protruding vaginal tissue  2/28/2006    HTN (hypertension)     Hyperlipidemia LDL goal <160     Obesity, unspecified     long standing problem - no signficant trials of weight loss in the past     Personal history of colonic polyps 5/2003    10 years - benign polyp only     Positive FIT (fecal immunochemical test) Jan 2015    Tubular adenoma of colon following result --> f/u colonoscopy 2020    ROUTINE GYN EXAMINATION 4/14/2007    3/06 [facility] - Dr Jones - pap smear negative    Unspecified cataract 2006    right eye - light and darkness only now - eye doctor once a year - Waldemar Good    Unspecified retinal detachment 1985    right eye - retinal detachment and cataracts      Unspecified visual loss 1985    right eye - light and dark only, membrane detached after - retinal specialist - nothing else surgically to fix it     URINARY SYS SYMPTOM NEC - urine leakage  2/28/2006    pad liner stable       Past Surgical History   Past Surgical History:   Procedure Laterality Date    [facility] NONSPECIFIC PROCEDURE  2000    R cataract IOL    [facility] NONSPECIFIC PROCEDURE  4/1985    detached retina repair    [facility] NONSPECIFIC PROCEDURE  1940s     tonsillectomy    [facility] NONSPECIFIC PROCEDURE  2000s    blephroplasty - both eyes     [facility] COLONOSCOPY THRU STOMA W BIOPSY/CAUTERY TUMOR/POLYP/LESION  5/2003    benign polyps - 2013 recommended        Prior to Admission Medications   Prior to Admission Medications   Prescriptions Last Dose Informant Patient Reported? Taking?   amLODIPine (NORVASC) 5 MG tablet   No Yes   Sig: Take 1 tablet (5 mg) by mouth daily.   metoprolol tartrate (LOPRESSOR) 50 MG tablet   No Yes   Sig: TAKE 1 TABLET BY MOUTH TWICE A DAY      Facility-Administered Medications: None        Review of Systems    Review of systems not obtained due to patient factors - intubation and sedation    Social History   I have reviewed this patient's social history and updated it with pertinent information if needed.  Social History     Tobacco Use    Smoking status: Never    Smokeless tobacco: Never   Substance Use Topics    Alcohol use: Yes     Alcohol/week: 7.0 standard drinks of alcohol     Types: 7 Glasses of wine per week     Comment: occasional glass of wine  q 2 weeks - 1 month     Drug use: No         Family History   I have reviewed this patient's family history and updated it with pertinent information if needed.  Family History   Problem Relation Age of Onset    Heart Disease Mother         unknown heart problem - angina in her 60s     Hypertension Mother     Heart Disease Father         cardiomegaly - mi as well - mi in his 50s     Diabetes Father         ? possible diagnosis - last few years of life      Heart Disease Brother         mi at 52 had another MI at 60    Heart Disease Brother         stent at 56    Heart Disease Brother         hypertention    Family History Negative Daughter     Family History Negative Daughter     Family History Negative Daughter     Cancer - colorectal No family hx of          Allergies   Allergies   Allergen Reactions    Apple [Pectin]      Gas - heartburn     Pollen [Pollen Extract]      Seasonal allergies     Sulfa Antibiotics Hives     Unknown, maybe Hives - childhood         Physical Exam   Vital Signs: Temp: 99.7  F (37.6  C) Temp src: Axillary BP: 103/57 Pulse: (!) 151   Resp: 25 SpO2: 98 % O2 Device: Mechanical Ventilator    Weight: 191 lbs 9.28 oz    Constitutional: Sedated and intubated  Eyes: Lids and lashes normal.   ENT: normocephalic, without obvious abnormality, atraumatic  Respiratory: Intubated, mechanical breath sounds  Cardiovascular: Central line in right internal jugular. Tachycardic. Cool hands.   GI: Soft, non distended  Skin: no bruising or bleeding, and normal skin color, texture, turgor  Musculoskeletal: Bilateral lower extremity edema  Neurologic: Sedated and intubated    Medical Decision Making       Please see A&P for additional details of medical decision making.      Data     I have personally reviewed the following data over the past 24 hrs:    24.7 (H)  \   11.6 (L)   / 297     133 (L) 101 28.7 (H) /  262 (H)   5.7 (H) 12 (L) 1.93 (H) \     ALT: 51 (H) AST: 240 (H) AP: 82 TBILI: 0.8   ALB: 3.0 (L) TOT PROTEIN: 7.5 LIPASE: N/A     Trop: 2,656 (HH) BNP: 27,426 (H)     Procal: N/A CRP: N/A Lactic Acid: 8.2 (HH)         Imaging results reviewed over the past 24 hrs:   Recent Results (from the past 24 hours)   XR Chest Port 1 View    Narrative    EXAM: XR CHEST PORT 1 VIEW  LOCATION: [facility]  DATE: 1/14/2025    INDICATION: Fever.  COMPARISON: None.      Impression    IMPRESSION: Prominent consolidation throughout the central  right lung. Mild patchy opacities at the left lung base. Correlate with multifocal pneumonia. Bilateral vascular congestion also noted that may represent pulmonary edema. Borderline prominent   cardiac silhouette.   XR Chest Port 1 View    Narrative    EXAM: XR CHEST PORT 1 VIEW  LOCATION: [facility]  DATE: 2025    INDICATION: Post-intubation.  COMPARISON: 2025 at 4:28 PM      Impression    IMPRESSION: Endotracheal tube tip 1.9 cm from the sukh. Nearly confluent right-sided infiltrates are increased from previous. Retrocardiac atelectasis and/or infiltrate similar to previous. Right IJ line noted with tip near the atriocaval junction.   POC US ECHO LIMITED    Narrative    Boby Pineda MD     2025  7:38 PM  Limited Bedside Cardiac Ultrasound, performed and interpreted by   me.   Indication: Elevated Troponin and Hypotension/shock.  parasternal short axis and apical 4 chamber views were acquired.   Image quality was challenging due to body habitus and critical   illness    Findings:    Abnormal global hypokinesis, no pericardial effusion    IMPRESSION: Abnormal limited cardiac ultrasound showing global   hypokinesis.  No pericardial effusion.    Echocardiogram Complete   Result Value    LVEF  20%    Narrative    [id number]  [id number]  [id number]  [id number]^BECCA^BOBY^LAKISHA     [facility]  Echocardiography Laboratory  [address] [place] [address]     Name: CHANG RDZ  MRN: [id number]  : 1940  Study Date: 2025 09:51 PM  Age: 84 yrs  Gender: Female  Patient Location: [facility]  Reason For Study: Shock  Ordering Physician: BOBY PINEDA  Referring Physician: holli doyle  Performed By: Herberth Dasilva     BSA: 1.9 m2  Height: 63 in  Weight: 185 lb  HR: 130  BP: 103/57 mmHg  ______________________________________________________________________________  Procedure  Echocardiogram with two-dimensional, color  and spectral Doppler. Definity (NDC  #68049-653) given intravenously. Adequate quality two-dimensional was  performed and interpreted.  ______________________________________________________________________________  Interpretation Summary     1. Mild left ventricular enlargmenet with severely decreased systolic  function. Estimated ejection fraction is 20%.  2. Severe generalized left ventricular hypokinesis with superimposed akinesis  of the anterolateral/inferolateral segments and apex.  3. Normal right ventricular size with moderately decreased systolic function.  4. Estimated right ventricular systolic pressure is 49 mmHg.  5. Moderate left atrial enlargement.  6. Restricted mitral leaflets (posterior leaflet). Severe mitral  regurgitation.  7. Moderate tricuspid regurgitation.  8. No prior exam available for comparison.  ______________________________________________________________________________  Left Ventricle  The left ventricle is mildly dilated. There is normal left ventricular wall  thickness. Left ventricular systolic function is severely reduced. The visual  ejection fraction is estimated at 20%. Severe generalized left ventricular  hypokinesis with superimposed akinesis of the anterolateral/inferolateral  segments and apex.     Right Ventricle  Normal right ventricular size with moderately decreased systolic function.     Atria  The left atrium is moderately dilated. The left atrial volume measurement is  unreliable due to poor angulation. Right atrial size is normal. Atrial septum  not well visualized.     Mitral Valve  Mildly thickened mitral valve. Restrcited posterior mitral leaflet. There is  severe (4+) mitral regurgitation. Posteriorly directed mitral regugitation  jet.     Tricuspid Valve  Mildly thickened tricuspid valve. There is moderate (2+) tricuspid  regurgitation.     Aortic Valve  The aortic valve is trileaflet with aortic valve sclerosis. No aortic  regurgitation is present. No  aortic stenosis is present.     Pulmonic Valve  The pulmonic valve is normal in structure and function.     Vessels  The aortic root is normal size. Normal size ascending aorta. Normal IVC size  with decreased inspiratory collapse.     Pericardium  There is no pericardial effusion.     Rhythm  The rhythm was sinus tachycardia.  ______________________________________________________________________________  MMode/2D Measurements & Calculations  IVSd: 0.90 cm     LVIDd: 5.6 cm  LVIDs: 3.5 cm  LVPWd: 0.80 cm  FS: 37.7 %  LV mass(C)d: 178.1 grams  LV mass(C)dI: 95.2 grams/m2  Ao root diam: 2.8 cm  asc Aorta Diam: 3.0 cm  Ao root diam index Ht(cm/m): 1.8  Ao root diam index BSA (cm/m2): 1.5  Asc Ao diam index BSA (cm/m2): 1.6  Asc Ao diam index Ht(cm/m): 1.9  LA Volume (BP): 65.3 ml     LA Volume Index (BP): 34.9 ml/m2  RV Base: 2.8 cm  RWT: 0.29  TAPSE: 1.6 cm     Doppler Measurements & Calculations  MV E max zane: 84.9 cm/sec  MV dec slope: 669.7 cm/sec2  MV dec time: 0.13 sec  LV V1 max P.7 mmHg  LV V1 max: 65.5 cm/sec  MR PISA: 8.3 cm2  MR ERO: 0.51 cm2  MR volume: 33.4 ml  PA acc time: 0.06 sec  TR max zane: 320.0 cm/sec  TR max P.0 mmHg  RVSP(TR): 49.0 mmHg  E/E' av.5  Lateral E/e': 13.7  Medial E/e': 15.3  RV S Zane: 26.6 cm/sec     ______________________________________________________________________________  Report approved by: Ada Issa MD on 2025 10:46 PM

## 2025-01-15 NOTE — PROCEDURES
Arterial Line Insertion  Date of Service: 1/14/2025  Pre-procedure diagnosis: Shock  Location: Left Femoral Artery  Performed by: Yobany Fierro MD    Indications:  The patient is a 84 year old female with a history of HTN, HLD who presented with shock and acute heart failure requiring vasopressor management and arterial line placement.    Procedure details:   Consent for the procedure was obtained. The left groin was prepped and draped in sterile fashion. Using ultrasound guidance, the introducer needle was inserted into the radial artery with return of pulsatile flow. A guidewire was advanced through the introducer needle which was then withdrawn. The arterial catheter was advanced over the wire, after which the wire was removed. An arterial waveform was confirmed on the monitor. The catheter was sutured to the skin and a sterile dressing was applied. The patient tolerated the procedure without any hemodynamic compromise.     Dr. Bradley was available for assistance during the procedure.    Yobany Fierro  Surgical Critical Care Fellow

## 2025-01-15 NOTE — PROGRESS NOTES
Death Note  Date of admission: 2025  Date of death: January 15, 2025    List of diagnoses  Overwhelming shock, likely primarily septic due to pneumonia, likely bacterial though cultures negative this time. She has steadily been requiring escalating doses of finally 3 vasopressors.   Cardiogenic shock    Stress cardiomyopathy  Acute respiratory failure and ARDS  Acute renal failure    Brief summary of hospitalization:  Patient was admitted through ED with overwhelming shock including escalating doses of vasopressors, mechanical ventilation, and antibiotics and other metabolic support. She had a complete cardiac evaluation and the component of her cardiac failure was likely due to sepsis. She was cultured and treated with broad spectrum antibiotics.     Her course was rapid deterioration despite maximal therapies. Her daughters were present and requested DNR on her behalf. She finally  at 0026 on 1/15/2025.    Georgi oglesby  January 15, 2025

## 2025-01-15 NOTE — PROVIDER NOTIFICATION
At 0010 Pt noted to have run of V-fib while Dr. Bradley was being updated regarding rising L.A. 12.7 pH 7.14.  at bedside pt noted to have no BP  and Heart electrical activity 60's.  Dr. Bradley at bedside spoke with Daughter Tabby.

## 2025-01-15 NOTE — ED NOTES
"Patient moaning, but she denies pain. Pt says, \"I feel like I'm going to die! Why do I keep moaning?\" Tachypneic on BiPAP. Daughter supportive at bedside. Both IV's remain patent.  "

## 2025-01-15 NOTE — PROGRESS NOTES
Death Note  Patient pronounced dead at 0026; family at bedside.     Georgi oglesby  January 15, 2025

## 2025-01-16 LAB
BACTERIA BLD CULT: NORMAL
BACTERIA BLD CULT: NORMAL

## 2025-01-19 LAB
BACTERIA BLD CULT: NO GROWTH
BACTERIA BLD CULT: NO GROWTH

## 2025-01-31 NOTE — DISCHARGE SUMMARY
Discharge summary  For documentation of discharge summary please see progress note for this calendar date January 15, 2025    Georgi oglesby  January 31, 2025

## 2025-02-01 DIAGNOSIS — I10 BENIGN HYPERTENSION: Chronic | ICD-10-CM

## 2025-02-01 RX ORDER — METOPROLOL TARTRATE 50 MG
50 TABLET ORAL 2 TIMES DAILY
Qty: 180 TABLET | Refills: 0 | OUTPATIENT
Start: 2025-02-01